# Patient Record
Sex: MALE | Race: WHITE | Employment: FULL TIME | URBAN - METROPOLITAN AREA
[De-identification: names, ages, dates, MRNs, and addresses within clinical notes are randomized per-mention and may not be internally consistent; named-entity substitution may affect disease eponyms.]

---

## 2018-07-02 ENCOUNTER — OFFICE VISIT (OUTPATIENT)
Dept: FAMILY MEDICINE CLINIC | Age: 23
End: 2018-07-02
Payer: COMMERCIAL

## 2018-07-02 ENCOUNTER — HOSPITAL ENCOUNTER (OUTPATIENT)
Age: 23
Discharge: HOME | End: 2018-07-04
Payer: COMMERCIAL

## 2018-07-02 VITALS
HEIGHT: 76 IN | HEART RATE: 96 BPM | WEIGHT: 229 LBS | SYSTOLIC BLOOD PRESSURE: 120 MMHG | OXYGEN SATURATION: 98 % | BODY MASS INDEX: 27.89 KG/M2 | RESPIRATION RATE: 18 BRPM | DIASTOLIC BLOOD PRESSURE: 88 MMHG | TEMPERATURE: 98.2 F

## 2018-07-02 DIAGNOSIS — R42 VERTIGO: ICD-10-CM

## 2018-07-02 DIAGNOSIS — R42 DIZZINESS: Primary | ICD-10-CM

## 2018-07-02 DIAGNOSIS — R42 DIZZINESS: ICD-10-CM

## 2018-07-02 LAB
ANION GAP SERPL CALCULATED.3IONS-SCNC: 12 MMOL/L (ref 7–16)
BASOPHILS ABSOLUTE: 0.03 E9/L (ref 0–0.2)
BASOPHILS RELATIVE PERCENT: 0.7 % (ref 0–2)
BUN BLDV-MCNC: 18 MG/DL (ref 6–20)
CALCIUM SERPL-MCNC: 9.6 MG/DL (ref 8.6–10.2)
CHLORIDE BLD-SCNC: 103 MMOL/L (ref 98–107)
CO2: 25 MMOL/L (ref 22–29)
CREAT SERPL-MCNC: 1.1 MG/DL (ref 0.7–1.2)
EOSINOPHILS ABSOLUTE: 0.15 E9/L (ref 0.05–0.5)
EOSINOPHILS RELATIVE PERCENT: 3.4 % (ref 0–6)
GFR AFRICAN AMERICAN: >60
GFR NON-AFRICAN AMERICAN: >60 ML/MIN/1.73
GLUCOSE BLD-MCNC: 91 MG/DL (ref 74–109)
HCT VFR BLD CALC: 45.8 % (ref 37–54)
HEMOGLOBIN: 15.6 G/DL (ref 12.5–16.5)
IMMATURE GRANULOCYTES #: 0.01 E9/L
IMMATURE GRANULOCYTES %: 0.2 % (ref 0–5)
LYMPHOCYTES ABSOLUTE: 1.48 E9/L (ref 1.5–4)
LYMPHOCYTES RELATIVE PERCENT: 33.1 % (ref 20–42)
MCH RBC QN AUTO: 29.4 PG (ref 26–35)
MCHC RBC AUTO-ENTMCNC: 34.1 % (ref 32–34.5)
MCV RBC AUTO: 86.4 FL (ref 80–99.9)
MONOCYTES ABSOLUTE: 0.28 E9/L (ref 0.1–0.95)
MONOCYTES RELATIVE PERCENT: 6.3 % (ref 2–12)
NEUTROPHILS ABSOLUTE: 2.52 E9/L (ref 1.8–7.3)
NEUTROPHILS RELATIVE PERCENT: 56.3 % (ref 43–80)
PDW BLD-RTO: 11.9 FL (ref 11.5–15)
PLATELET # BLD: 140 E9/L (ref 130–450)
PMV BLD AUTO: 12.1 FL (ref 7–12)
POTASSIUM SERPL-SCNC: 4.1 MMOL/L (ref 3.5–5)
RBC # BLD: 5.3 E12/L (ref 3.8–5.8)
SODIUM BLD-SCNC: 140 MMOL/L (ref 132–146)
WBC # BLD: 4.5 E9/L (ref 4.5–11.5)

## 2018-07-02 PROCEDURE — 1036F TOBACCO NON-USER: CPT | Performed by: FAMILY MEDICINE

## 2018-07-02 PROCEDURE — G8419 CALC BMI OUT NRM PARAM NOF/U: HCPCS | Performed by: FAMILY MEDICINE

## 2018-07-02 PROCEDURE — 99213 OFFICE O/P EST LOW 20 MIN: CPT | Performed by: FAMILY MEDICINE

## 2018-07-02 PROCEDURE — 80048 BASIC METABOLIC PNL TOTAL CA: CPT

## 2018-07-02 PROCEDURE — 85025 COMPLETE CBC W/AUTO DIFF WBC: CPT

## 2018-07-02 PROCEDURE — 36415 COLL VENOUS BLD VENIPUNCTURE: CPT | Performed by: FAMILY MEDICINE

## 2018-07-02 PROCEDURE — G8427 DOCREV CUR MEDS BY ELIG CLIN: HCPCS | Performed by: FAMILY MEDICINE

## 2018-07-02 RX ORDER — MECLIZINE HYDROCHLORIDE 25 MG/1
25 TABLET ORAL 3 TIMES DAILY PRN
COMMUNITY
End: 2018-09-10

## 2018-07-02 RX ORDER — METHYLPREDNISOLONE 4 MG/1
TABLET ORAL
Qty: 1 KIT | Refills: 0 | Status: SHIPPED | OUTPATIENT
Start: 2018-07-02 | End: 2018-09-10

## 2018-07-02 ASSESSMENT — ENCOUNTER SYMPTOMS
SORE THROAT: 0
WHEEZING: 0
COLOR CHANGE: 0
BACK PAIN: 0
DIARRHEA: 0
PHOTOPHOBIA: 0
SINUS PRESSURE: 0
VOMITING: 0
COUGH: 0
ABDOMINAL PAIN: 0
ALLERGIC/IMMUNOLOGIC NEGATIVE: 1
BLOOD IN STOOL: 0
APNEA: 0
NAUSEA: 0
FACIAL SWELLING: 0
SHORTNESS OF BREATH: 0
CHEST TIGHTNESS: 0

## 2018-07-02 ASSESSMENT — PATIENT HEALTH QUESTIONNAIRE - PHQ9
SUM OF ALL RESPONSES TO PHQ9 QUESTIONS 1 & 2: 0
2. FEELING DOWN, DEPRESSED OR HOPELESS: 0
SUM OF ALL RESPONSES TO PHQ QUESTIONS 1-9: 0
1. LITTLE INTEREST OR PLEASURE IN DOING THINGS: 0

## 2018-07-02 NOTE — PROGRESS NOTES
Chief Complaint:   Chief Complaint   Patient presents with    Dizziness     x2 weeks, last 3-4 days has been worse, went to ER and was told it was vertigo       Dizziness   This is a new problem. The current episode started in the past 7 days. The problem occurs intermittently. The problem has been gradually worsening. Pertinent negatives include no abdominal pain, arthralgias, chest pain, congestion, coughing, headaches, joint swelling, myalgias, nausea, rash, sore throat, vomiting or weakness. There is no problem list on file for this patient. No past medical history on file. No past surgical history on file. Current Outpatient Prescriptions   Medication Sig Dispense Refill    meclizine (ANTIVERT) 25 MG tablet Take 25 mg by mouth 3 times daily as needed      methylPREDNISolone (MEDROL, SHINE,) 4 MG tablet Take by mouth. 1 kit 0     No current facility-administered medications for this visit. Allergies   Allergen Reactions    Pcn [Penicillins] Rash       Social History     Social History    Marital status: Single     Spouse name: N/A    Number of children: N/A    Years of education: N/A     Social History Main Topics    Smoking status: Never Smoker    Smokeless tobacco: Never Used    Alcohol use No    Drug use: No    Sexual activity: Not Asked     Other Topics Concern    None     Social History Narrative    None       No family history on file. Review of Systems   Constitutional: Negative. HENT: Negative for congestion, facial swelling, hearing loss, nosebleeds, sinus pressure and sore throat. Eyes: Negative for photophobia and visual disturbance. Respiratory: Negative for apnea, cough, chest tightness, shortness of breath and wheezing. Cardiovascular: Negative for chest pain, palpitations and leg swelling. Gastrointestinal: Negative for abdominal pain, blood in stool, diarrhea, nausea and vomiting.    Genitourinary: Negative for difficulty urinating, frequency and urgency. Musculoskeletal: Negative for arthralgias, back pain, joint swelling and myalgias. Skin: Negative for color change and rash. Allergic/Immunologic: Negative. Neurological: Positive for dizziness. Negative for syncope, weakness, light-headedness and headaches. Hematological: Negative. Psychiatric/Behavioral: Negative for agitation, behavioral problems, confusion and self-injury. The patient is not nervous/anxious. All other systems reviewed and are negative. Physical Exam   Constitutional: He is oriented to person, place, and time. He appears well-developed and well-nourished. No distress. HENT:   Head: Normocephalic and atraumatic. Nose: Nose normal.   Mouth/Throat: Oropharynx is clear and moist.   Eyes: Conjunctivae and EOM are normal. Pupils are equal, round, and reactive to light. Neck: Normal range of motion. Neck supple. No JVD present. No thyromegaly present. Cardiovascular: Normal rate, regular rhythm and normal heart sounds. Exam reveals no gallop and no friction rub. No murmur heard. Pulmonary/Chest: Effort normal and breath sounds normal. No respiratory distress. He has no wheezes. Abdominal: Soft. Bowel sounds are normal. He exhibits no distension. There is no tenderness. There is no rebound and no guarding. Musculoskeletal: Normal range of motion. Lymphadenopathy:     He has no cervical adenopathy. Neurological: He is alert and oriented to person, place, and time. He has normal reflexes. No cranial nerve deficit. He exhibits normal muscle tone. Coordination normal.   Skin: Skin is warm and dry. No rash noted. No erythema. Psychiatric: He has a normal mood and affect. His behavior is normal. Judgment normal.   Nursing note and vitals reviewed. ASSESSMENT/PLAN:    Rafael Hernandez was seen today for dizziness.     Diagnoses and all orders for this visit:    Dizziness  -     CBC Auto Differential; Future  -     BASIC METABOLIC PANEL;

## 2018-09-10 ENCOUNTER — OFFICE VISIT (OUTPATIENT)
Dept: FAMILY MEDICINE CLINIC | Age: 23
End: 2018-09-10
Payer: COMMERCIAL

## 2018-09-10 VITALS
TEMPERATURE: 98.2 F | RESPIRATION RATE: 18 BRPM | DIASTOLIC BLOOD PRESSURE: 78 MMHG | OXYGEN SATURATION: 98 % | BODY MASS INDEX: 27.89 KG/M2 | HEIGHT: 76 IN | WEIGHT: 229 LBS | HEART RATE: 83 BPM | SYSTOLIC BLOOD PRESSURE: 110 MMHG

## 2018-09-10 DIAGNOSIS — R42 DIZZINESS: Primary | ICD-10-CM

## 2018-09-10 DIAGNOSIS — R42 VERTIGO: ICD-10-CM

## 2018-09-10 PROCEDURE — G8419 CALC BMI OUT NRM PARAM NOF/U: HCPCS | Performed by: FAMILY MEDICINE

## 2018-09-10 PROCEDURE — 1036F TOBACCO NON-USER: CPT | Performed by: FAMILY MEDICINE

## 2018-09-10 PROCEDURE — G8427 DOCREV CUR MEDS BY ELIG CLIN: HCPCS | Performed by: FAMILY MEDICINE

## 2018-09-10 PROCEDURE — 99214 OFFICE O/P EST MOD 30 MIN: CPT | Performed by: FAMILY MEDICINE

## 2018-09-10 ASSESSMENT — ENCOUNTER SYMPTOMS
DIARRHEA: 0
APNEA: 0
SINUS PRESSURE: 0
BLOOD IN STOOL: 0
ABDOMINAL PAIN: 0
BACK PAIN: 0
COLOR CHANGE: 0
VOMITING: 0
SORE THROAT: 0
NAUSEA: 0
FACIAL SWELLING: 0
ALLERGIC/IMMUNOLOGIC NEGATIVE: 1
SHORTNESS OF BREATH: 0
WHEEZING: 0
CHEST TIGHTNESS: 0
COUGH: 0
PHOTOPHOBIA: 0

## 2018-09-10 NOTE — PROGRESS NOTES
Chief Complaint:   Chief Complaint   Patient presents with    Dizziness     needs forms filled out for [employer] physical saying hes clear to work       Dizziness   This is a new problem. The current episode started more than 1 month ago. The problem occurs rarely. The problem has been resolved. Pertinent negatives include no abdominal pain, arthralgias, chest pain, congestion, coughing, headaches, joint swelling, myalgias, nausea, rash, sore throat, vomiting or weakness. dizziness resolved    There is no problem list on file for this patient. No past medical history on file. No past surgical history on file. No current outpatient prescriptions on file. No current facility-administered medications for this visit. Allergies   Allergen Reactions    Pcn [Penicillins] Rash       Social History     Social History    Marital status: Single     Spouse name: N/A    Number of children: N/A    Years of education: N/A     Social History Main Topics    Smoking status: Never Smoker    Smokeless tobacco: Never Used    Alcohol use No    Drug use: No    Sexual activity: Not Asked     Other Topics Concern    None     Social History Narrative    None       No family history on file. Review of Systems   Constitutional: Negative. HENT: Negative for congestion, facial swelling, hearing loss, nosebleeds, sinus pressure and sore throat. Eyes: Negative for photophobia and visual disturbance. Respiratory: Negative for apnea, cough, chest tightness, shortness of breath and wheezing. Cardiovascular: Negative for chest pain, palpitations and leg swelling. Gastrointestinal: Negative for abdominal pain, blood in stool, diarrhea, nausea and vomiting. Genitourinary: Negative for difficulty urinating, frequency and urgency. Musculoskeletal: Negative for arthralgias, back pain, joint swelling and myalgias. Skin: Negative for color change and rash. Allergic/Immunologic: Negative.

## 2019-01-22 RX ORDER — VALACYCLOVIR HYDROCHLORIDE 1 G/1
1000 TABLET, FILM COATED ORAL 2 TIMES DAILY
Qty: 20 TABLET | Refills: 1 | Status: SHIPPED | OUTPATIENT
Start: 2019-01-22 | End: 2019-03-15

## 2019-01-25 ENCOUNTER — HOSPITAL ENCOUNTER (OUTPATIENT)
Dept: AUDIOLOGY | Age: 24
Discharge: HOME | End: 2019-01-25
Payer: COMMERCIAL

## 2019-01-25 PROCEDURE — 92552 PURE TONE AUDIOMETRY AIR: CPT | Performed by: AUDIOLOGIST

## 2019-03-15 ENCOUNTER — HOSPITAL ENCOUNTER (OUTPATIENT)
Age: 24
Discharge: HOME | End: 2019-03-17
Payer: COMMERCIAL

## 2019-03-15 ENCOUNTER — OFFICE VISIT (OUTPATIENT)
Dept: FAMILY MEDICINE CLINIC | Age: 24
End: 2019-03-15
Payer: COMMERCIAL

## 2019-03-15 ENCOUNTER — HOSPITAL ENCOUNTER (OUTPATIENT)
Dept: GENERAL RADIOLOGY | Age: 24
Discharge: HOME | End: 2019-03-17
Payer: COMMERCIAL

## 2019-03-15 VITALS
HEART RATE: 75 BPM | WEIGHT: 240 LBS | SYSTOLIC BLOOD PRESSURE: 120 MMHG | BODY MASS INDEX: 29.22 KG/M2 | HEIGHT: 76 IN | DIASTOLIC BLOOD PRESSURE: 76 MMHG | RESPIRATION RATE: 18 BRPM | TEMPERATURE: 98.1 F | OXYGEN SATURATION: 97 %

## 2019-03-15 DIAGNOSIS — S13.9XXA NECK SPRAIN, INITIAL ENCOUNTER: ICD-10-CM

## 2019-03-15 DIAGNOSIS — S13.9XXA NECK SPRAIN, INITIAL ENCOUNTER: Primary | ICD-10-CM

## 2019-03-15 PROCEDURE — G8484 FLU IMMUNIZE NO ADMIN: HCPCS | Performed by: FAMILY MEDICINE

## 2019-03-15 PROCEDURE — 72050 X-RAY EXAM NECK SPINE 4/5VWS: CPT

## 2019-03-15 PROCEDURE — 1036F TOBACCO NON-USER: CPT | Performed by: FAMILY MEDICINE

## 2019-03-15 PROCEDURE — 99213 OFFICE O/P EST LOW 20 MIN: CPT | Performed by: FAMILY MEDICINE

## 2019-03-15 PROCEDURE — G8419 CALC BMI OUT NRM PARAM NOF/U: HCPCS | Performed by: FAMILY MEDICINE

## 2019-03-15 PROCEDURE — G8427 DOCREV CUR MEDS BY ELIG CLIN: HCPCS | Performed by: FAMILY MEDICINE

## 2019-03-15 RX ORDER — PREDNISONE 20 MG/1
20 TABLET ORAL 2 TIMES DAILY
Qty: 10 TABLET | Refills: 0 | Status: SHIPPED | OUTPATIENT
Start: 2019-03-15 | End: 2019-03-20

## 2019-03-15 RX ORDER — ORPHENADRINE CITRATE 100 MG/1
100 TABLET, EXTENDED RELEASE ORAL NIGHTLY PRN
Qty: 10 TABLET | Refills: 0 | Status: SHIPPED | OUTPATIENT
Start: 2019-03-15 | End: 2019-03-25

## 2019-03-15 ASSESSMENT — ENCOUNTER SYMPTOMS
SINUS PRESSURE: 0
SORE THROAT: 0
CHEST TIGHTNESS: 0
COLOR CHANGE: 0
COUGH: 0
FACIAL SWELLING: 0
WHEEZING: 0
ALLERGIC/IMMUNOLOGIC NEGATIVE: 1
APNEA: 0
SHORTNESS OF BREATH: 0
DIARRHEA: 0
VOMITING: 0
NAUSEA: 0
BACK PAIN: 0
ABDOMINAL PAIN: 0
PHOTOPHOBIA: 0
BLOOD IN STOOL: 0

## 2019-03-15 ASSESSMENT — PATIENT HEALTH QUESTIONNAIRE - PHQ9
1. LITTLE INTEREST OR PLEASURE IN DOING THINGS: 0
SUM OF ALL RESPONSES TO PHQ QUESTIONS 1-9: 0
2. FEELING DOWN, DEPRESSED OR HOPELESS: 0
SUM OF ALL RESPONSES TO PHQ QUESTIONS 1-9: 0
SUM OF ALL RESPONSES TO PHQ9 QUESTIONS 1 & 2: 0

## 2020-02-02 ENCOUNTER — HOSPITAL ENCOUNTER (EMERGENCY)
Age: 25
Discharge: HOME | End: 2020-02-02
Payer: COMMERCIAL

## 2020-02-02 VITALS
RESPIRATION RATE: 16 BRPM | OXYGEN SATURATION: 98 % | DIASTOLIC BLOOD PRESSURE: 80 MMHG | HEART RATE: 89 BPM | TEMPERATURE: 97.8 F | WEIGHT: 230 LBS | BODY MASS INDEX: 28.01 KG/M2 | SYSTOLIC BLOOD PRESSURE: 128 MMHG | HEIGHT: 76 IN

## 2020-02-02 LAB
INFLUENZA A BY PCR: NOT DETECTED
INFLUENZA B BY PCR: DETECTED

## 2020-02-02 PROCEDURE — 87502 INFLUENZA DNA AMP PROBE: CPT

## 2020-02-02 PROCEDURE — 99283 EMERGENCY DEPT VISIT LOW MDM: CPT

## 2020-02-02 RX ORDER — OSELTAMIVIR PHOSPHATE 75 MG/1
75 CAPSULE ORAL 2 TIMES DAILY
Qty: 10 CAPSULE | Refills: 0 | Status: SHIPPED | OUTPATIENT
Start: 2020-02-02 | End: 2020-02-07

## 2020-02-02 RX ORDER — IBUPROFEN 800 MG/1
800 TABLET ORAL EVERY 6 HOURS PRN
Qty: 21 TABLET | Refills: 0 | Status: SHIPPED | OUTPATIENT
Start: 2020-02-02 | End: 2020-09-18

## 2020-02-02 ASSESSMENT — PAIN DESCRIPTION - PAIN TYPE: TYPE: ACUTE PAIN

## 2020-02-02 ASSESSMENT — PAIN SCALES - GENERAL: PAINLEVEL_OUTOF10: 7

## 2020-02-02 ASSESSMENT — PAIN DESCRIPTION - PROGRESSION: CLINICAL_PROGRESSION: GRADUALLY WORSENING

## 2020-02-02 ASSESSMENT — PAIN DESCRIPTION - LOCATION: LOCATION: GENERALIZED

## 2020-02-02 ASSESSMENT — PAIN DESCRIPTION - FREQUENCY: FREQUENCY: CONTINUOUS

## 2020-02-02 ASSESSMENT — PAIN DESCRIPTION - ONSET: ONSET: ON-GOING

## 2020-02-02 ASSESSMENT — PAIN - FUNCTIONAL ASSESSMENT: PAIN_FUNCTIONAL_ASSESSMENT: PREVENTS OR INTERFERES SOME ACTIVE ACTIVITIES AND ADLS

## 2020-02-02 ASSESSMENT — PAIN DESCRIPTION - DESCRIPTORS: DESCRIPTORS: ACHING

## 2020-02-02 NOTE — ED PROVIDER NOTES
[facility]    HPI: Deedee Mendoza is a 22 y.o. male with a past medical history of  has no past medical history on file. presenting with complaints of a cough with nasal congestion. The patient states that these symptoms began gradually. The history is obtained from the patient. The patient states that he has had some subjective chills at home. Patient does complain of a mild cough associated with it that is nonproductive. Patient denies excessive fatigue or sleeping greater than 18 hours a day. Patient denies exposure to mononucleosis. The patient denies any abdominal pain, left upper quadrant fullness, or early satiety. The patient also denies difficulty breathing, hemoptysis, neck pain/stiffness, or blurry vision. Sx have persisted and are mildly worse which is what prompted the visit today. unkown exposure to sick contacts, ending a fever, body aches, chills, nasal congestion with cough which he states began 2 days ago. Earlier fever earlier today was at 101.2. He did take Tylenol. He is afebrile upon arrival.        ROS:   Pertinent positives and negatives are stated within HPI, all other systems reviewed and are negative.      --------------------------------------------- PAST HISTORY ---------------------------------------------  Past Medical History:  has no past medical history on file. Past Surgical History:  has no past surgical history on file. Social History:  reports that he has never smoked. He has never used smokeless tobacco. He reports that he does not drink alcohol or use drugs. Family History: family history is not on file. The patients home medications have been reviewed. Allergies: Pcn [penicillins]    ------------------------- NURSING NOTES AND VITALS REVIEWED ---------------------------   The nursing notes within the ED encounter and vital signs as below have been reviewed by myself.   /80   Pulse 89   Temp 97.8 °F (36.6 °C) (Oral)   Resp 16   Ht 6' 4\" (1.93 m)   Wt 230 lb (104.3 kg)   SpO2 98%   BMI 28.00 kg/m²   Oxygen Saturation Interpretation: Normal    The patients available past medical records and past encounters were reviewed. Physical exam:  Constitutional: Vital signs are reviewed the patient is comfortable. The patient is alert and oriented and conversant. Head: The head is atraumatic and normocephalic. Eyes: No discharge is present from the eyes. The sclera are normal.  ENT: The oropharynx demonstrates a small amount of erythema bilaterally. There is no tonsillar enlargement nor is there any exudate present. No uvular deviation or edema. No tonsillary asymmetry.  Floor of the mouth soft, no trismus, handling secretions. TMs bilaterally demonstrate no evidence of infection. Neck: Normal range of motion is achieved in the neck. There is no JVD present. No meningeal signs are present   Anterior cervical adenopathy is nromal.  Respiratory/chest: The chest is nontender. Breath sounds are normal. There is no respiratory distress.   Cardiovascular: Heart shows a regular rate and rhythm without murmurs clicks or gallops. Abdominal exam: The abdomen is non tender without evidence of peritoneal signs.  Specific attention to the left upper quadrant with palpation of the spleen demonstrates no organomegaly or tenderness  Skin: warm and dry, without rash  Neurologic: GCS 15   Psych: Normal Affect  -------------------------------------------------- RESULTS -------------------------------------------------    LABS:  Results for orders placed or performed during the hospital encounter of 02/02/20   Rapid influenza A/B antigens   Result Value Ref Range    Influenza A by PCR Not Detected Not Detected    Influenza B by PCR DETECTED (A) Not Detected       RADIOLOGY:  Interpreted by Radiologist.  No orders to display         ------------------------------ ED COURSE/MEDICAL DECISION MAKING----------------------  Medications - No data to

## 2020-09-17 ENCOUNTER — NURSE TRIAGE (OUTPATIENT)
Dept: OTHER | Facility: CLINIC | Age: 25
End: 2020-09-17

## 2020-09-17 ENCOUNTER — TELEPHONE (OUTPATIENT)
Dept: PRIMARY CARE CLINIC | Age: 25
End: 2020-09-17

## 2020-09-17 ENCOUNTER — HOSPITAL ENCOUNTER (EMERGENCY)
Age: 25
Discharge: HOME | End: 2020-09-17
Payer: COMMERCIAL

## 2020-09-17 VITALS
HEART RATE: 91 BPM | DIASTOLIC BLOOD PRESSURE: 78 MMHG | WEIGHT: 240 LBS | RESPIRATION RATE: 16 BRPM | SYSTOLIC BLOOD PRESSURE: 128 MMHG | OXYGEN SATURATION: 98 % | TEMPERATURE: 98.7 F | BODY MASS INDEX: 29.21 KG/M2

## 2020-09-17 PROCEDURE — 99283 EMERGENCY DEPT VISIT LOW MDM: CPT

## 2020-09-17 PROCEDURE — 6360000002 HC RX W HCPCS: Performed by: PHYSICIAN ASSISTANT

## 2020-09-17 PROCEDURE — 99282 EMERGENCY DEPT VISIT SF MDM: CPT

## 2020-09-17 PROCEDURE — 96372 THER/PROPH/DIAG INJ SC/IM: CPT

## 2020-09-17 RX ORDER — DEXAMETHASONE SODIUM PHOSPHATE 10 MG/ML
10 INJECTION, SOLUTION INTRAMUSCULAR; INTRAVENOUS ONCE
Status: COMPLETED | OUTPATIENT
Start: 2020-09-17 | End: 2020-09-17

## 2020-09-17 RX ORDER — PREDNISONE 20 MG/1
TABLET ORAL
Qty: 18 TABLET | Refills: 0 | Status: SHIPPED | OUTPATIENT
Start: 2020-09-17 | End: 2020-09-27

## 2020-09-17 RX ADMIN — DEXAMETHASONE SODIUM PHOSPHATE 10 MG: 10 INJECTION, SOLUTION INTRAMUSCULAR; INTRAVENOUS at 20:24

## 2020-09-17 ASSESSMENT — PAIN SCALES - GENERAL: PAINLEVEL_OUTOF10: 7

## 2020-09-17 ASSESSMENT — PAIN DESCRIPTION - DESCRIPTORS: DESCRIPTORS: ITCHING

## 2020-09-17 NOTE — TELEPHONE ENCOUNTER
Reason for Disposition   Patient wants to be seen    Answer Assessment - Initial Assessment Questions  1. APPEARANCE of RASH: \"Describe the rash. \"       Red welts   2. LOCATION: \"Where is the rash located? \"       Both forearms up arms face  3. SIZE: \"How large is the rash? \"       Random small spots some are welts   4. ONSET: \"When did the rash begin? \"       Tuesday night   5. ITCHING: \"Does the rash itch? \" If so, ask: \"How bad is it? \"    - MILD - doesn't interfere with normal activities    - MODERATE-SEVERE: interferes with work, school, sleep, or other activities       Yes moderate  6. PREGNANCY: \"Is there any chance you are pregnant? \" \"When was your last menstrual period? \"      n/a    Protocols used: POISON IVY - [place] - SUMAC-ADULT-[place]    Pt will be seen tomorrow morning   Caller provided care advice and instructed to call back with worsening symptoms. Please do not respond to the triage nurse through this encounter. Any subsequent communication should be directly with the patient.

## 2020-09-18 ENCOUNTER — OFFICE VISIT (OUTPATIENT)
Dept: PRIMARY CARE CLINIC | Age: 25
End: 2020-09-18
Payer: COMMERCIAL

## 2020-09-18 VITALS
DIASTOLIC BLOOD PRESSURE: 74 MMHG | RESPIRATION RATE: 16 BRPM | OXYGEN SATURATION: 98 % | HEIGHT: 76 IN | SYSTOLIC BLOOD PRESSURE: 128 MMHG | HEART RATE: 104 BPM | BODY MASS INDEX: 30.2 KG/M2 | WEIGHT: 248 LBS | TEMPERATURE: 97.8 F

## 2020-09-18 PROCEDURE — G8427 DOCREV CUR MEDS BY ELIG CLIN: HCPCS | Performed by: FAMILY MEDICINE

## 2020-09-18 PROCEDURE — 99213 OFFICE O/P EST LOW 20 MIN: CPT | Performed by: FAMILY MEDICINE

## 2020-09-18 PROCEDURE — 1036F TOBACCO NON-USER: CPT | Performed by: FAMILY MEDICINE

## 2020-09-18 PROCEDURE — G8419 CALC BMI OUT NRM PARAM NOF/U: HCPCS | Performed by: FAMILY MEDICINE

## 2020-09-18 ASSESSMENT — PATIENT HEALTH QUESTIONNAIRE - PHQ9
1. LITTLE INTEREST OR PLEASURE IN DOING THINGS: 0
SUM OF ALL RESPONSES TO PHQ9 QUESTIONS 1 & 2: 0
SUM OF ALL RESPONSES TO PHQ QUESTIONS 1-9: 0
2. FEELING DOWN, DEPRESSED OR HOPELESS: 0
SUM OF ALL RESPONSES TO PHQ QUESTIONS 1-9: 0

## 2020-09-18 ASSESSMENT — ENCOUNTER SYMPTOMS
APNEA: 0
VOMITING: 0
RHINORRHEA: 0
COLOR CHANGE: 0
SORE THROAT: 0
ABDOMINAL PAIN: 0
DIARRHEA: 0
BACK PAIN: 0
CONSTIPATION: 0
BLOOD IN STOOL: 0
NAUSEA: 0
NAIL CHANGES: 0
EYE REDNESS: 0
EYE PAIN: 0
COUGH: 0
EYE ITCHING: 0
WHEEZING: 0
CHEST TIGHTNESS: 0
SHORTNESS OF BREATH: 0
SINUS PRESSURE: 0

## 2020-09-18 NOTE — ED PROVIDER NOTES
[facility]       Department of Emergency Medicine   ED  Provider Note  Admit Date/RoomTime: 9/17/2020  7:46 PM  ED Room: 13/13  Chief Complaint   Poison Ivy (Pt states rash started yesterday after cutting wood)    History of Present Illness   Source of history provided by:  patient. History/Exam Limitations: none. Сергей Cash is a 26 y.o. old male with has a past medical history of: No past medical history on file. presents to the emergency department by private vehicle, for complaint of persistent pruritic rash which started yesterday of his upper arms and face. Patient states the day before he was cutting wood and might of been exposed to some type of poison ivy. Patient states he has tried calamine lotion and other topical poison ivy remedies with no improvement. Patient denies anything making it worse. Patient denies any other symptoms or complaints at this time. ROS    Pertinent positives and negatives are stated within HPI, all other systems reviewed and are negative. No past surgical history on file. Social History:  reports that he has never smoked. He has never used smokeless tobacco. He reports that he does not drink alcohol or use drugs. Family History: family history is not on file. Allergies: Pcn [penicillins]    Physical Exam           ED Triage Vitals   BP Temp Temp Source Pulse Resp SpO2 Height Weight   09/17/20 1953 09/17/20 1953 09/17/20 1953 09/17/20 1953 09/17/20 1953 09/17/20 1953 -- 09/17/20 2002   128/78 98.7 °F (37.1 °C) Infrared 91 16 98 %  240 lb (108.9 kg)     Oxygen Saturation Interpretation: Normal.    Constitutional:  Alert, development consistent with age. HEENT:  NC/NT. Airway patent. Eyes:  PERRL, EOMI, no discharge. Ears:  TMs without perforation, injection, or bulging. External canals clear without exudate.   Mouth:  Mucous membranes moist without lesions, tongue and gums normal.  Throat:  Pharynx without injection, exudate, or tonsillar hypertrophy. Airway patient. Neck:  Supple. No lymphadenopathy. Respiratory:  Clear to auscultation and breath sounds equal.  CV:  Regular rate and rhythm. GI:  Abdomen Soft, nontender, +BS. Integument:  Skin turgor: Normal.              Linear papular rash of the upper arms and face c/w plant dermatitis. Neurological:  Orientation age-appropriate unless noted elseware. Motor functions intact. Lab / Imaging Results   (All laboratory and radiology results have been personally reviewed by myself)  Labs:  No results found for this visit on 09/17/20. Imaging: All Radiology results interpreted by Radiologist unless otherwise noted. No orders to display       ED Course / Medical Decision Making     Medications   dexamethasone (PF) (DECADRON) injection 10 mg (has no administration in time range)        Consults:   None    Procedures:   none    MDM:   Patient presenting with poison ivy. Patient is in no acute distress, afebrile, nontoxic in appearance. Patient will be given a dose of Decadron here. Patient also be given a prednisone taper. Discussed supportive care with patient. Recommended patient follow-up with PCP. Recommended patient return to the ED with new or worsening of symptoms. Counseling: The emergency provider has spoken with the patient and discussed todays results, in addition to providing specific details for the plan of care and counseling regarding the diagnosis and prognosis. Questions are answered at this time and they are agreeable with the plan. Assessment      1. Plant dermatitis      Plan   Discharge to home  Patient condition is stable    New Medications     New Prescriptions    PREDNISONE (DELTASONE) 20 MG TABLET    Sig.: Take 60mg  Po qd x 3 days, then 40mg po qd x3 days, then 20mg po qd x 3 days. QS x 9 days     Electronically signed by Forest Bee PA-C   DD: 9/17/20  **This report was transcribed using voice recognition software.  Every effort was made to

## 2020-09-18 NOTE — PROGRESS NOTES
Chief Complaint:     Chief Complaint   Patient presents with    Rash     went to ER lst night becasue it started going onto his face and by his eye, was told it was poison ivy         Rash   This is a new problem. The current episode started yesterday. The problem is unchanged. The affected locations include the right elbow, right arm, left arm and left elbow. The rash is characterized by redness, pain and itchiness. Pertinent negatives include no congestion, cough, diarrhea, eye pain, fatigue, fever, joint pain, nail changes, rhinorrhea, shortness of breath, sore throat or vomiting. Past treatments include nothing. The treatment provided no relief. There is no problem list on file for this patient. History reviewed. No pertinent past medical history. History reviewed. No pertinent surgical history. Current Outpatient Medications   Medication Sig Dispense Refill    predniSONE (DELTASONE) 20 MG tablet Sig.: Take 60mg  Po qd x 3 days, then 40mg po qd x3 days, then 20mg po qd x 3 days. QS x 9 days (Patient not taking: Reported on 9/18/2020) 18 tablet 0     No current facility-administered medications for this visit.         Allergies   Allergen Reactions    Pcn [Penicillins] Rash       Social History     Socioeconomic History    Marital status: Single     Spouse name: None    Number of children: None    Years of education: None    Highest education level: None   Occupational History    None   Social Needs    Financial resource strain: None    Food insecurity     Worry: None     Inability: None    Transportation needs     Medical: None     Non-medical: None   Tobacco Use    Smoking status: Never Smoker    Smokeless tobacco: Never Used   Substance and Sexual Activity    Alcohol use: No    Drug use: No    Sexual activity: None   Lifestyle    Physical activity     Days per week: None     Minutes per session: None    Stress: None   Relationships    Social connections     Talks on phone: 30.19 kg/m²     Physical Exam  Vitals signs and nursing note reviewed. Constitutional:       General: He is not in acute distress. Appearance: Normal appearance. He is well-developed. HENT:      Head: Normocephalic and atraumatic. Right Ear: Hearing, tympanic membrane and external ear normal. No tenderness. No middle ear effusion. Left Ear: Hearing, tympanic membrane and external ear normal. No tenderness. No middle ear effusion. Nose: Nose normal. No congestion or rhinorrhea. Right Turbinates: Not enlarged. Left Turbinates: Not enlarged. Mouth/Throat:      Mouth: Mucous membranes are moist.      Tongue: No lesions. Pharynx: Oropharynx is clear. No oropharyngeal exudate or posterior oropharyngeal erythema. Eyes:      General: No scleral icterus. Conjunctiva/sclera: Conjunctivae normal.      Pupils: Pupils are equal, round, and reactive to light. Neck:      Musculoskeletal: Normal range of motion and neck supple. No neck rigidity or muscular tenderness. Thyroid: No thyromegaly. Cardiovascular:      Rate and Rhythm: Normal rate and regular rhythm. Heart sounds: Normal heart sounds. No murmur. Pulmonary:      Effort: Pulmonary effort is normal. No respiratory distress. Breath sounds: Normal breath sounds. No wheezing or rales. Abdominal:      General: Bowel sounds are normal. There is no distension. Palpations: Abdomen is soft. Tenderness: There is no abdominal tenderness. Musculoskeletal: Normal range of motion. General: No tenderness. Lymphadenopathy:      Cervical: No cervical adenopathy. Skin:     General: Skin is warm and dry. Findings: No erythema or rash. Neurological:      General: No focal deficit present. Mental Status: He is alert and oriented to person, place, and time. Cranial Nerves: No cranial nerve deficit. Deep Tendon Reflexes: Reflexes are normal and symmetric.  Reflexes normal. Psychiatric:         Mood and Affect: Mood normal.                                 ASSESSMENT/PLAN:    There is no problem list on file for this patient. Pastora Jara was seen today for rash. Diagnoses and all orders for this visit:    Allergic dermatitis due to poison ivy      Received IM steroid in ED  Has Rx for oral prednisone    Return if symptoms worsen or fail to improve. I spent 15 minutes with this patient. I spent greater than 50% of the time counseling this patient.         Marion Wyatt DO  9/18/2020  8:23 AM

## 2020-09-25 ENCOUNTER — TELEPHONE (OUTPATIENT)
Dept: PRIMARY CARE CLINIC | Age: 25
End: 2020-09-25

## 2020-09-25 RX ORDER — PREDNISONE 20 MG/1
TABLET ORAL
Qty: 11 TABLET | Refills: 0 | Status: SHIPPED
Start: 2020-09-25 | End: 2021-06-23

## 2020-09-25 NOTE — TELEPHONE ENCOUNTER
Patient calling he has on pill of prednisone left for tomorrow but still developing new areas of poison ivy wanting to know your recommendations.   Mary Holland

## 2020-10-12 RX ORDER — VALACYCLOVIR HYDROCHLORIDE 1 G/1
1000 TABLET, FILM COATED ORAL 2 TIMES DAILY
Qty: 20 TABLET | Refills: 1 | Status: SHIPPED
Start: 2020-10-12 | End: 2021-06-23

## 2021-01-14 ENCOUNTER — TELEPHONE (OUTPATIENT)
Dept: PRIMARY CARE CLINIC | Age: 26
End: 2021-01-14

## 2021-01-14 DIAGNOSIS — R05.9 COUGH: Primary | ICD-10-CM

## 2021-01-14 RX ORDER — BENZONATATE 100 MG/1
100 CAPSULE ORAL 3 TIMES DAILY PRN
Qty: 30 CAPSULE | Refills: 0 | Status: SHIPPED | OUTPATIENT
Start: 2021-01-14 | End: 2021-01-21

## 2021-01-14 RX ORDER — AZITHROMYCIN 250 MG/1
TABLET, FILM COATED ORAL
Qty: 6 TABLET | Refills: 0 | Status: SHIPPED
Start: 2021-01-14 | End: 2021-06-23

## 2021-01-14 NOTE — TELEPHONE ENCOUNTER
Pt calling said he tested positive for covid this morning. Was not given anything wants to know if there is anything that you could call in for him. symptoms are, fever, body aches, headache, head congestion & cough.

## 2021-06-23 ENCOUNTER — APPOINTMENT (OUTPATIENT)
Dept: CT IMAGING | Age: 26
End: 2021-06-23
Payer: COMMERCIAL

## 2021-06-23 ENCOUNTER — HOSPITAL ENCOUNTER (EMERGENCY)
Age: 26
Discharge: HOME | End: 2021-06-24
Attending: EMERGENCY MEDICINE
Payer: COMMERCIAL

## 2021-06-23 DIAGNOSIS — H81.10 BENIGN PAROXYSMAL POSITIONAL VERTIGO, UNSPECIFIED LATERALITY: Primary | ICD-10-CM

## 2021-06-23 DIAGNOSIS — E86.0 DEHYDRATION: ICD-10-CM

## 2021-06-23 LAB — SARS-COV-2, NAAT: NOT DETECTED

## 2021-06-23 PROCEDURE — 72125 CT NECK SPINE W/O DYE: CPT

## 2021-06-23 PROCEDURE — 96375 TX/PRO/DX INJ NEW DRUG ADDON: CPT

## 2021-06-23 PROCEDURE — 2580000003 HC RX 258: Performed by: EMERGENCY MEDICINE

## 2021-06-23 PROCEDURE — 70450 CT HEAD/BRAIN W/O DYE: CPT

## 2021-06-23 PROCEDURE — 99283 EMERGENCY DEPT VISIT LOW MDM: CPT

## 2021-06-23 PROCEDURE — 87635 SARS-COV-2 COVID-19 AMP PRB: CPT

## 2021-06-23 PROCEDURE — 96361 HYDRATE IV INFUSION ADD-ON: CPT

## 2021-06-23 PROCEDURE — 6360000002 HC RX W HCPCS: Performed by: EMERGENCY MEDICINE

## 2021-06-23 PROCEDURE — 96374 THER/PROPH/DIAG INJ IV PUSH: CPT

## 2021-06-23 RX ORDER — DIPHENHYDRAMINE HYDROCHLORIDE 50 MG/ML
50 INJECTION INTRAMUSCULAR; INTRAVENOUS ONCE
Status: COMPLETED | OUTPATIENT
Start: 2021-06-23 | End: 2021-06-23

## 2021-06-23 RX ORDER — 0.9 % SODIUM CHLORIDE 0.9 %
1000 INTRAVENOUS SOLUTION INTRAVENOUS ONCE
Status: COMPLETED | OUTPATIENT
Start: 2021-06-23 | End: 2021-06-24

## 2021-06-23 RX ORDER — LORAZEPAM 2 MG/ML
1 INJECTION INTRAMUSCULAR ONCE
Status: COMPLETED | OUTPATIENT
Start: 2021-06-23 | End: 2021-06-23

## 2021-06-23 RX ORDER — METOCLOPRAMIDE HYDROCHLORIDE 5 MG/ML
10 INJECTION INTRAMUSCULAR; INTRAVENOUS ONCE
Status: COMPLETED | OUTPATIENT
Start: 2021-06-23 | End: 2021-06-23

## 2021-06-23 RX ADMIN — DIPHENHYDRAMINE HYDROCHLORIDE 50 MG: 50 INJECTION INTRAMUSCULAR; INTRAVENOUS at 22:31

## 2021-06-23 RX ADMIN — SODIUM CHLORIDE 1000 ML: 9 INJECTION, SOLUTION INTRAVENOUS at 23:04

## 2021-06-23 RX ADMIN — METOCLOPRAMIDE HYDROCHLORIDE 10 MG: 5 INJECTION INTRAMUSCULAR; INTRAVENOUS at 22:31

## 2021-06-23 RX ADMIN — LORAZEPAM 1 MG: 2 INJECTION INTRAMUSCULAR; INTRAVENOUS at 22:31

## 2021-06-23 RX ADMIN — SODIUM CHLORIDE 1000 ML: 9 INJECTION, SOLUTION INTRAVENOUS at 22:31

## 2021-06-23 ASSESSMENT — PAIN DESCRIPTION - PAIN TYPE: TYPE: ACUTE PAIN

## 2021-06-23 ASSESSMENT — PAIN DESCRIPTION - LOCATION: LOCATION: NECK

## 2021-06-23 ASSESSMENT — PAIN SCALES - GENERAL: PAINLEVEL_OUTOF10: 6

## 2021-06-23 ASSESSMENT — PAIN DESCRIPTION - DESCRIPTORS: DESCRIPTORS: CONSTANT;NAGGING

## 2021-06-24 ENCOUNTER — TELEPHONE (OUTPATIENT)
Dept: PRIMARY CARE CLINIC | Age: 26
End: 2021-06-24

## 2021-06-24 VITALS
DIASTOLIC BLOOD PRESSURE: 74 MMHG | HEIGHT: 76 IN | RESPIRATION RATE: 18 BRPM | TEMPERATURE: 97 F | WEIGHT: 230 LBS | OXYGEN SATURATION: 97 % | HEART RATE: 81 BPM | BODY MASS INDEX: 28.01 KG/M2 | SYSTOLIC BLOOD PRESSURE: 118 MMHG

## 2021-06-24 RX ORDER — CYCLOBENZAPRINE HCL 10 MG
10 TABLET ORAL 3 TIMES DAILY PRN
Qty: 20 TABLET | Refills: 0 | Status: SHIPPED | OUTPATIENT
Start: 2021-06-24 | End: 2021-07-04

## 2021-06-24 RX ORDER — BUTALBITAL, ACETAMINOPHEN AND CAFFEINE 300; 40; 50 MG/1; MG/1; MG/1
1 CAPSULE ORAL EVERY 4 HOURS PRN
Qty: 30 CAPSULE | Refills: 0 | Status: SHIPPED | OUTPATIENT
Start: 2021-06-24 | End: 2021-07-15

## 2021-06-24 RX ORDER — LORATADINE AND PSEUDOEPHEDRINE SULFATE 5; 120 MG/1; MG/1
1 TABLET, EXTENDED RELEASE ORAL 2 TIMES DAILY
Qty: 20 TABLET | Refills: 0 | Status: SHIPPED | OUTPATIENT
Start: 2021-06-24 | End: 2021-07-12

## 2021-06-24 RX ORDER — MECLIZINE HYDROCHLORIDE 25 MG/1
25 TABLET ORAL 3 TIMES DAILY PRN
Qty: 20 TABLET | Refills: 1 | Status: SHIPPED | OUTPATIENT
Start: 2021-06-24 | End: 2021-07-15

## 2021-06-24 NOTE — ED PROVIDER NOTES
HPI:  6/24/21, Time: 12:43 AM EDT        Sesar Lawler is a 23 y.o. male presenting to the ED for intermittent neck discomfort x1 week post 2 weeks of fatigue, intermittent dizziness and also nausea and vomiting intermittently. The complaint has been persistent, moderate in severity, and worsened by nothing. Patient has not had any reported fevers/chills, no cough no colored sputum production, no hemoptysis, no change in bowel habit patterns except as stated above. Patient is not had any diarrhea, no reported hematemesis no melena no hematochezia reported. Patient denies any history of any known exposures. Patient rates his pain at 6/10 severity. No relieving factors are reported. Patient denies any focal extremity weakness, no slurred speech or difficulty speaking and no vision loss associated. Review of Systems:   A complete review of systems was performed and pertinent positives and negatives are stated within HPI, all other systems reviewed and are negative.    --------------------------------------------- PAST HISTORY ---------------------------------------------  Past Medical History:  has no past medical history on file. Past Surgical History:  has no past surgical history on file. Social History:  reports that he has never smoked. He has never used smokeless tobacco. He reports that he does not drink alcohol and does not use drugs. Family History: family history is not on file. The patients home medications have been reviewed.     Allergies: Pcn [penicillins]    -------------------------------------------------- RESULTS -------------------------------------------------  All laboratory and radiology results have been personally reviewed by myself   LABS:  Results for orders placed or performed during the hospital encounter of 06/23/21   COVID-19, Rapid    Specimen: Nasopharyngeal Swab   Result Value Ref Range    SARS-CoV-2, NAAT Not Detected Not Detected       RADIOLOGY:  Interpreted by Radiologist.  [address]   Final Result   No acute fracture or subluxation in the cervical spine. Small broad-based posterior disc osteophyte complex at C5-C6. No definite central canal stenosis or neural foraminal narrowing at any level. An air bubble in the soft tissues of the left upper hemithorax anteriorly,   which may be in a venous structure and related to recent vascular access   attempt. CT HEAD WO CONTRAST   Final Result   No acute intracranial abnormality.           ------------------------- NURSING NOTES AND VITALS REVIEWED ---------------------------    The nursing notes within the ED encounter and vital signs as below have been reviewed. /74   Pulse 81   Temp 97 °F (36.1 °C) (Infrared)   Resp 18   Ht 6' 4\" (1.93 m)   Wt 230 lb (104.3 kg)   SpO2 97%   BMI 28.00 kg/m² . Oxygen Saturation Interpretation: Normal    ---------------------------------------------------PHYSICAL EXAM--------------------------------------    Constitutional/General: Alert and oriented x3, well appearing, non toxic in moderate distress on the  Head: Normocephalic and atraumatic  Eyes: PERRL, EOMI  Mouth: Oropharynx clear, handling secretions, no trismus  Neck: Supple, full ROM,   Pulmonary: Lungs clear to auscultation bilaterally, no wheezes, rales, or rhonchi. Not in respiratory distress  Cardiovascular:  Regular rate and rhythm, no murmurs, gallops, or rubs. 2+ distal pulses  Abdomen: Soft, non tender, non distended, get a megaly no mass no guarding or rigidity normal bowel sounds  Extremities: Moves all extremities x 4.  Warm and well perfused  Skin: warm and dry without rash  Neurologic: GCS 15, cranial nerves II through XII intact no focal deficits no meningeal signs but  Psych: Normal Affect    ------------------------------ ED COURSE/MEDICAL DECISION MAKING----------------------  Medications   0.9 % sodium chloride bolus (0 mLs Intravenous Stopped 6/24/21 0101) diphenhydrAMINE (BENADRYL) injection 50 mg (50 mg Intravenous Given 6/23/21 2231)   metoclopramide (REGLAN) injection 10 mg (10 mg Intravenous Given 6/23/21 2231)   LORazepam (ATIVAN) injection 1 mg (1 mg Intravenous Given 6/23/21 2231)   0.9 % sodium chloride bolus (0 mLs Intravenous Stopped 6/24/21 0101)       ED COURSE:     Medical Decision Making:   Patient's headache is felt to be likely musculoskeletal /muscular tension related. SAH is not felt to be likely as patient does not report this as the worst headache of his life, nor was there a sudden thunderclap onset of headache. .  He likely has intermittent cervical strain and his fiancée did report he complained of \"popping\" sensation in his neck at times with certain movement. Patient is not had any radicular symptoms to cyst to suggest a cervical radiculopathy and his CT cervical study did not show any evidence of significant disc herniation. 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 Patient symptoms were abated at the time of discharge he is given home-going prescription for Antivert as needed he is referred to his PCP for outpatient follow-up. Counseling: The emergency provider has spoken with the patient and discussed todays results, in addition to providing specific details for the plan of care and counseling regarding the diagnosis and prognosis. Questions are answered at this time and they are agreeable with the plan.    --------------------------------- IMPRESSION AND DISPOSITION ---------------------------------    IMPRESSION  1. Benign paroxysmal positional vertigo, unspecified laterality    2. Dehydration        DISPOSITION  Disposition: Discharge to home  Patient condition is stable      NOTE: This report was transcribed using voice recognition software.  Every effort was made to ensure accuracy; however, inadvertent computerized transcription errors may be present        Stacy Chan MD  06/24/21 0284

## 2021-06-24 NOTE — TELEPHONE ENCOUNTER
Patients mom called stating that the patient is still dizzy and lightheaded with neck pain. She stated that they really didn't do much for hi at ER last evening. She wanted to have patient added on to schedule tomorrow to see PCP. No appointments available. Patient is scheduled to see him on Tuesday at this time. Please contact patient if he is able to be added to his schedule.  Thank you

## 2021-06-25 ENCOUNTER — OFFICE VISIT (OUTPATIENT)
Dept: PRIMARY CARE CLINIC | Age: 26
End: 2021-06-25
Payer: COMMERCIAL

## 2021-06-25 VITALS
WEIGHT: 230 LBS | RESPIRATION RATE: 16 BRPM | SYSTOLIC BLOOD PRESSURE: 118 MMHG | OXYGEN SATURATION: 98 % | HEIGHT: 76 IN | DIASTOLIC BLOOD PRESSURE: 78 MMHG | BODY MASS INDEX: 28.01 KG/M2 | HEART RATE: 78 BPM

## 2021-06-25 DIAGNOSIS — R42 DIZZINESS: ICD-10-CM

## 2021-06-25 DIAGNOSIS — M50.30 DDD (DEGENERATIVE DISC DISEASE), CERVICAL: Primary | ICD-10-CM

## 2021-06-25 LAB
ALBUMIN SERPL-MCNC: 4.5 G/DL (ref 3.5–5.2)
ALP BLD-CCNC: 75 U/L (ref 40–129)
ALT SERPL-CCNC: 26 U/L (ref 0–40)
ANION GAP SERPL CALCULATED.3IONS-SCNC: 11 MMOL/L (ref 7–16)
AST SERPL-CCNC: 21 U/L (ref 0–39)
BASOPHILS ABSOLUTE: 0.03 E9/L (ref 0–0.2)
BASOPHILS RELATIVE PERCENT: 0.7 % (ref 0–2)
BILIRUB SERPL-MCNC: 0.5 MG/DL (ref 0–1.2)
BUN BLDV-MCNC: 18 MG/DL (ref 6–20)
CALCIUM SERPL-MCNC: 9.2 MG/DL (ref 8.6–10.2)
CHLORIDE BLD-SCNC: 101 MMOL/L (ref 98–107)
CO2: 27 MMOL/L (ref 22–29)
CREAT SERPL-MCNC: 1.1 MG/DL (ref 0.7–1.2)
EOSINOPHILS ABSOLUTE: 0.08 E9/L (ref 0.05–0.5)
EOSINOPHILS RELATIVE PERCENT: 1.8 % (ref 0–6)
GFR AFRICAN AMERICAN: >60
GFR NON-AFRICAN AMERICAN: >60 ML/MIN/1.73
GLUCOSE BLD-MCNC: 71 MG/DL (ref 74–99)
HCT VFR BLD CALC: 42.8 % (ref 37–54)
HEMOGLOBIN: 14.5 G/DL (ref 12.5–16.5)
IMMATURE GRANULOCYTES #: 0.01 E9/L
IMMATURE GRANULOCYTES %: 0.2 % (ref 0–5)
LYMPHOCYTES ABSOLUTE: 1.35 E9/L (ref 1.5–4)
LYMPHOCYTES RELATIVE PERCENT: 30.1 % (ref 20–42)
MCH RBC QN AUTO: 30 PG (ref 26–35)
MCHC RBC AUTO-ENTMCNC: 33.9 % (ref 32–34.5)
MCV RBC AUTO: 88.4 FL (ref 80–99.9)
MONOCYTES ABSOLUTE: 0.36 E9/L (ref 0.1–0.95)
MONOCYTES RELATIVE PERCENT: 8 % (ref 2–12)
NEUTROPHILS ABSOLUTE: 2.65 E9/L (ref 1.8–7.3)
NEUTROPHILS RELATIVE PERCENT: 59.2 % (ref 43–80)
PDW BLD-RTO: 12.2 FL (ref 11.5–15)
PLATELET # BLD: 142 E9/L (ref 130–450)
PMV BLD AUTO: 11.2 FL (ref 7–12)
POTASSIUM SERPL-SCNC: 4 MMOL/L (ref 3.5–5)
RBC # BLD: 4.84 E12/L (ref 3.8–5.8)
SODIUM BLD-SCNC: 139 MMOL/L (ref 132–146)
TOTAL PROTEIN: 6.8 G/DL (ref 6.4–8.3)
WBC # BLD: 4.5 E9/L (ref 4.5–11.5)

## 2021-06-25 PROCEDURE — 1036F TOBACCO NON-USER: CPT | Performed by: FAMILY MEDICINE

## 2021-06-25 PROCEDURE — G8417 CALC BMI ABV UP PARAM F/U: HCPCS | Performed by: FAMILY MEDICINE

## 2021-06-25 PROCEDURE — G8427 DOCREV CUR MEDS BY ELIG CLIN: HCPCS | Performed by: FAMILY MEDICINE

## 2021-06-25 PROCEDURE — 99214 OFFICE O/P EST MOD 30 MIN: CPT | Performed by: FAMILY MEDICINE

## 2021-06-25 RX ORDER — METHYLPREDNISOLONE 4 MG/1
TABLET ORAL
Qty: 1 KIT | Refills: 0 | Status: SHIPPED
Start: 2021-06-25 | End: 2021-07-15

## 2021-06-25 ASSESSMENT — ENCOUNTER SYMPTOMS
SHORTNESS OF BREATH: 0
FACIAL SWELLING: 0
BLOOD IN STOOL: 0
SORE THROAT: 0
ABDOMINAL PAIN: 0
WHEEZING: 0
PHOTOPHOBIA: 0
NAUSEA: 0
ALLERGIC/IMMUNOLOGIC NEGATIVE: 1
COLOR CHANGE: 0
BACK PAIN: 0
DIARRHEA: 0
APNEA: 0
CHEST TIGHTNESS: 0
COUGH: 0
VOMITING: 0
SINUS PRESSURE: 0

## 2021-06-25 ASSESSMENT — PATIENT HEALTH QUESTIONNAIRE - PHQ9
2. FEELING DOWN, DEPRESSED OR HOPELESS: 0
1. LITTLE INTEREST OR PLEASURE IN DOING THINGS: 0
SUM OF ALL RESPONSES TO PHQ QUESTIONS 1-9: 0
SUM OF ALL RESPONSES TO PHQ9 QUESTIONS 1 & 2: 0
SUM OF ALL RESPONSES TO PHQ QUESTIONS 1-9: 0
SUM OF ALL RESPONSES TO PHQ QUESTIONS 1-9: 0

## 2021-06-25 NOTE — PROGRESS NOTES
Chief Complaint:   Chief Complaint   Patient presents with    Neck Pain     went to ER on 6/23/21, for neck pain, dizzy, light headed, had CT and said something was found on chest/shoulder       Neck Pain   This is a new problem. The current episode started in the past 7 days. The problem occurs daily. The problem has been unchanged. The pain is present in the midline. The quality of the pain is described as aching. The pain is at a severity of 3/10. The pain is mild. Pertinent negatives include no chest pain, headaches, photophobia or weakness. Dizziness  This is a new problem. The current episode started in the past 7 days. The problem occurs daily. Associated symptoms include neck pain and vertigo. Pertinent negatives include no abdominal pain, arthralgias, chest pain, congestion, coughing, headaches, joint swelling, myalgias, nausea, rash, sore throat, vomiting or weakness. There is no problem list on file for this patient. No past medical history on file. No past surgical history on file. Current Outpatient Medications   Medication Sig Dispense Refill    methylPREDNISolone (MEDROL, SHINE,) 4 MG tablet Take by mouth. 1 kit 0    butalbital-APAP-caffeine (FIORICET) -40 MG CAPS per capsule Take 1 capsule by mouth every 4 hours as needed for Headaches 30 capsule 0    CLARITIN-D 12 HOUR 5-120 MG per extended release tablet Take 1 tablet by mouth 2 times daily For congestion relief as needed. 20 tablet 0    meclizine (ANTIVERT) 25 MG tablet Take 1 tablet by mouth 3 times daily as needed for Dizziness 20 tablet 1    cyclobenzaprine (FLEXERIL) 10 MG tablet Take 1 tablet by mouth 3 times daily as needed for Muscle spasms /severe neck pain. No driving/working after taking this medicine--causes drowsiness. 20 tablet 0     No current facility-administered medications for this visit.        Allergies   Allergen Reactions    Pcn [Penicillins] Rash       Social History     Socioeconomic History  Marital status: Single     Spouse name: None    Number of children: None    Years of education: None    Highest education level: None   Occupational History    None   Tobacco Use    Smoking status: Never Smoker    Smokeless tobacco: Never Used   Substance and Sexual Activity    Alcohol use: No    Drug use: No    Sexual activity: None   Other Topics Concern    None   Social History Narrative    None     Social Determinants of Health     Financial Resource Strain:     Difficulty of Paying Living Expenses:    Food Insecurity:     Worried About Running Out of Food in the Last Year:     Ran Out of Food in the Last Year:    Transportation Needs:     Lack of Transportation (Medical):  Lack of Transportation (Non-Medical):    Physical Activity:     Days of Exercise per Week:     Minutes of Exercise per Session:    Stress:     Feeling of Stress :    Social Connections:     Frequency of Communication with Friends and Family:     Frequency of Social Gatherings with Friends and Family:     Attends Mormon Services:     Active Member of Clubs or Organizations:     Attends Club or Organization Meetings:     Marital Status:    Intimate Partner Violence:     Fear of Current or Ex-Partner:     Emotionally Abused:     Physically Abused:     Sexually Abused:        No family history on file. Review of Systems   Constitutional: Negative. HENT: Negative for congestion, facial swelling, hearing loss, nosebleeds, sinus pressure and sore throat. Eyes: Negative for photophobia and visual disturbance. Respiratory: Negative for apnea, cough, chest tightness, shortness of breath and wheezing. Cardiovascular: Negative for chest pain, palpitations and leg swelling. Gastrointestinal: Negative for abdominal pain, blood in stool, diarrhea, nausea and vomiting. Genitourinary: Negative for difficulty urinating, frequency and urgency. Musculoskeletal: Positive for neck pain.  Negative for arthralgias, back pain, joint swelling and myalgias. Skin: Negative for color change and rash. Allergic/Immunologic: Negative. Neurological: Positive for dizziness and vertigo. Negative for syncope, weakness, light-headedness and headaches. Hematological: Negative. Psychiatric/Behavioral: Negative for agitation, behavioral problems, confusion and self-injury. The patient is not nervous/anxious. All other systems reviewed and are negative. Physical Exam  Vitals and nursing note reviewed. Constitutional:       General: He is not in acute distress. Appearance: He is well-developed. HENT:      Head: Normocephalic and atraumatic. Nose: Nose normal.   Eyes:      Conjunctiva/sclera: Conjunctivae normal.      Pupils: Pupils are equal, round, and reactive to light. Neck:      Thyroid: No thyromegaly. Vascular: No JVD. Cardiovascular:      Rate and Rhythm: Normal rate and regular rhythm. Heart sounds: Normal heart sounds. No murmur heard. No friction rub. No gallop. Pulmonary:      Effort: Pulmonary effort is normal. No respiratory distress. Breath sounds: Normal breath sounds. No wheezing. Abdominal:      General: Bowel sounds are normal. There is no distension. Palpations: Abdomen is soft. Tenderness: There is no abdominal tenderness. There is no guarding or rebound. Musculoskeletal:         General: Normal range of motion. Cervical back: Normal range of motion and neck supple. Tenderness present. Lymphadenopathy:      Cervical: No cervical adenopathy. Skin:     General: Skin is warm and dry. Findings: No erythema or rash. Neurological:      Mental Status: He is alert and oriented to person, place, and time. Cranial Nerves: No cranial nerve deficit. Motor: No abnormal muscle tone. Coordination: Coordination normal.      Deep Tendon Reflexes: Reflexes are normal and symmetric.    Psychiatric:         Behavior: Behavior normal.         Judgment: Judgment normal.                               ASSESSMENT/PLAN:    Dacula Meredith was seen today for neck pain. Diagnoses and all orders for this visit:    DDD (degenerative disc disease), cervical  -     methylPREDNISolone (MEDROL, SHINE,) 4 MG tablet; Take by mouth. Dizziness  -     Comprehensive Metabolic Panel;  Future  -     CBC Auto Differential; Future    continue marisa Rehman DO    6/25/2021  1:13 PM

## 2021-06-28 ENCOUNTER — TELEPHONE (OUTPATIENT)
Dept: PRIMARY CARE CLINIC | Age: 26
End: 2021-06-28

## 2021-06-28 NOTE — TELEPHONE ENCOUNTER
Patient was recently seen for vertigo.  He has his CDL and drives truck, wants to know if theres anything he can take when hes driving if this happens

## 2021-07-02 ENCOUNTER — TELEPHONE (OUTPATIENT)
Dept: PRIMARY CARE CLINIC | Age: 26
End: 2021-07-02

## 2021-07-02 NOTE — TELEPHONE ENCOUNTER
Pt calling asking if you can give him a RTW note stating he can go back on 7/6. He was dx with vertigo and is a . email [email or web address]

## 2021-07-02 NOTE — LETTER
[address]  Caitlin PINEDA [address]  Phone: [phone]  Fax: [facility]        July 6, 2021     Patient: Shaquille Earl   YOB: 1995   Date of Visit: 7/2/2021       To Whom It May Concern: It is my medical opinion that Shaquille Earl may return to full duty immediately with no restrictions. If you have any questions or concerns, please don't hesitate to call.     Sincerely,        Shayne Valdivia DO

## 2021-07-02 NOTE — LETTER
[address]  Lalo PINEDA [address]  Phone: [phone]  Fax: [facility], DO        July 6, 2021     Patient: Gloria Dunn   YOB: 1995   Date of Visit: 7/2/2021       To Whom It May Concern: It is my medical opinion that Gloria Dunn has diagnosis of benign positional vertigo . He is scheduled for a referral to an ENT specialist. He should remain out of work until seen by the specialist..     If you have any questions or concerns, please don't hesitate to call.     Sincerely,        Desirae Ritchie DO

## 2021-07-06 ENCOUNTER — TELEPHONE (OUTPATIENT)
Dept: PRIMARY CARE CLINIC | Age: 26
End: 2021-07-06

## 2021-07-06 DIAGNOSIS — R42 DIZZINESS: Primary | ICD-10-CM

## 2021-07-06 NOTE — TELEPHONE ENCOUNTER
The pt has been dealing with some vertigo and with him being a  he would like to see a specialist about it.  He is calling to see if you can please refer him to see someone

## 2021-07-06 NOTE — TELEPHONE ENCOUNTER
Pt is currently at work and can't work because he needs this sent over to that e-mail as soon as possible please.

## 2021-07-06 NOTE — TELEPHONE ENCOUNTER
Patient states that his work will not let him work with the dx of benign positional vertigo unless he is episode free for 2 months. Pt states that the note needs to list his diagnosis, and any restrictions.

## 2021-07-12 ENCOUNTER — OFFICE VISIT (OUTPATIENT)
Dept: ENT CLINIC | Age: 26
End: 2021-07-12
Payer: COMMERCIAL

## 2021-07-12 VITALS
HEIGHT: 76 IN | WEIGHT: 230 LBS | DIASTOLIC BLOOD PRESSURE: 79 MMHG | SYSTOLIC BLOOD PRESSURE: 130 MMHG | HEART RATE: 102 BPM | BODY MASS INDEX: 28.01 KG/M2

## 2021-07-12 DIAGNOSIS — R42 DIZZINESS OF UNKNOWN ETIOLOGY: Primary | ICD-10-CM

## 2021-07-12 PROCEDURE — G8417 CALC BMI ABV UP PARAM F/U: HCPCS | Performed by: OTOLARYNGOLOGY

## 2021-07-12 PROCEDURE — G8427 DOCREV CUR MEDS BY ELIG CLIN: HCPCS | Performed by: OTOLARYNGOLOGY

## 2021-07-12 PROCEDURE — 4004F PT TOBACCO SCREEN RCVD TLK: CPT | Performed by: OTOLARYNGOLOGY

## 2021-07-12 PROCEDURE — 99203 OFFICE O/P NEW LOW 30 MIN: CPT | Performed by: OTOLARYNGOLOGY

## 2021-07-12 NOTE — PROGRESS NOTES
All other systems reviewed and are negative. /79   Pulse 102   Ht 6' 4\" (1.93 m)   Wt 230 lb (104.3 kg)   BMI 28.00 kg/m²   Physical Exam  Vitals and nursing note reviewed. Constitutional:       Appearance: He is well-developed. HENT:      Head: Normocephalic and atraumatic. Right Ear: Tympanic membrane and ear canal normal.      Left Ear: Tympanic membrane and ear canal normal.      Nose: Congestion and rhinorrhea present. Mouth/Throat:      Mouth: Mucous membranes are dry. Eyes:      Pupils: Pupils are equal, round, and reactive to light. Neck:      Thyroid: No thyromegaly. Trachea: No tracheal deviation. Cardiovascular:      Rate and Rhythm: Normal rate. Pulmonary:      Effort: Pulmonary effort is normal. No respiratory distress. Musculoskeletal:         General: Normal range of motion. Cervical back: Normal range of motion. Lymphadenopathy:      Cervical: No cervical adenopathy. Skin:     General: Skin is warm. Findings: No erythema. Neurological:      Mental Status: He is alert. Cranial Nerves: No cranial nerve deficit. IMPRESSION/PLAN:  She is seen and examined, physical exam findings as well as Jagdish-Hallpike are consistent with nonperipheral disequilibrium likely related to positional changes secondary to orthostatic hypotension, recommend the patient increase hydration, and follow-up with his PCP for further evaluation. Dr. Juanpablo Cole Otolaryngology/Facial Plastic Surgery Residency  Associate Clinical Professor:  Patrick Cowart, [facility]

## 2021-07-15 ENCOUNTER — OFFICE VISIT (OUTPATIENT)
Dept: PRIMARY CARE CLINIC | Age: 26
End: 2021-07-15
Payer: COMMERCIAL

## 2021-07-15 VITALS
OXYGEN SATURATION: 98 % | TEMPERATURE: 97.8 F | SYSTOLIC BLOOD PRESSURE: 128 MMHG | DIASTOLIC BLOOD PRESSURE: 80 MMHG | HEART RATE: 74 BPM | WEIGHT: 222.8 LBS | BODY MASS INDEX: 27.12 KG/M2

## 2021-07-15 DIAGNOSIS — M50.30 DDD (DEGENERATIVE DISC DISEASE), CERVICAL: ICD-10-CM

## 2021-07-15 DIAGNOSIS — R42 DIZZINESS: Primary | ICD-10-CM

## 2021-07-15 DIAGNOSIS — R27.0 ATAXIA: ICD-10-CM

## 2021-07-15 PROCEDURE — 4004F PT TOBACCO SCREEN RCVD TLK: CPT | Performed by: FAMILY MEDICINE

## 2021-07-15 PROCEDURE — G8417 CALC BMI ABV UP PARAM F/U: HCPCS | Performed by: FAMILY MEDICINE

## 2021-07-15 PROCEDURE — G8427 DOCREV CUR MEDS BY ELIG CLIN: HCPCS | Performed by: FAMILY MEDICINE

## 2021-07-15 PROCEDURE — 99213 OFFICE O/P EST LOW 20 MIN: CPT | Performed by: FAMILY MEDICINE

## 2021-07-15 SDOH — ECONOMIC STABILITY: FOOD INSECURITY: WITHIN THE PAST 12 MONTHS, YOU WORRIED THAT YOUR FOOD WOULD RUN OUT BEFORE YOU GOT MONEY TO BUY MORE.: NEVER TRUE

## 2021-07-15 SDOH — ECONOMIC STABILITY: FOOD INSECURITY: WITHIN THE PAST 12 MONTHS, THE FOOD YOU BOUGHT JUST DIDN'T LAST AND YOU DIDN'T HAVE MONEY TO GET MORE.: NEVER TRUE

## 2021-07-15 ASSESSMENT — ENCOUNTER SYMPTOMS
ABDOMINAL PAIN: 0
VOMITING: 0
CHEST TIGHTNESS: 0
SHORTNESS OF BREATH: 0
BLOOD IN STOOL: 0
SORE THROAT: 0
EYE PAIN: 0
SINUS PRESSURE: 0
APNEA: 0
PHOTOPHOBIA: 0
CONSTIPATION: 0
DIARRHEA: 0
WHEEZING: 0
COUGH: 0
NAUSEA: 0
COLOR CHANGE: 0
RHINORRHEA: 0
EYE ITCHING: 0
EYE REDNESS: 0
BACK PAIN: 0

## 2021-07-15 ASSESSMENT — SOCIAL DETERMINANTS OF HEALTH (SDOH): HOW HARD IS IT FOR YOU TO PAY FOR THE VERY BASICS LIKE FOOD, HOUSING, MEDICAL CARE, AND HEATING?: NOT HARD AT ALL

## 2021-07-15 NOTE — PROGRESS NOTES
Chief Complaint:     Chief Complaint   Patient presents with    Dizziness         Dizziness  This is a new problem. The current episode started in the past 7 days. The problem occurs daily. Associated symptoms include neck pain and vertigo. Pertinent negatives include no abdominal pain, arthralgias, chest pain, congestion, coughing, fatigue, fever, headaches, joint swelling, myalgias, nausea, numbness, rash, sore throat, vomiting or weakness. Neck Pain   This is a new problem. The current episode started in the past 7 days. The problem occurs daily. The problem has been unchanged. The pain is present in the midline. The quality of the pain is described as aching. The pain is at a severity of 3/10. The pain is mild. Pertinent negatives include no chest pain, fever, headaches, numbness, photophobia or weakness. There is no problem list on file for this patient. History reviewed. No pertinent past medical history. History reviewed. No pertinent surgical history. No current outpatient medications on file. No current facility-administered medications for this visit.        Allergies   Allergen Reactions    Pcn [Penicillins] Rash       Social History     Socioeconomic History    Marital status: Single     Spouse name: None    Number of children: None    Years of education: None    Highest education level: None   Occupational History    None   Tobacco Use    Smoking status: Never Smoker    Smokeless tobacco: Current User     Types: Chew    Tobacco comment: rarely   Substance and Sexual Activity    Alcohol use: Yes     Comment: social    Drug use: No    Sexual activity: None   Other Topics Concern    None   Social History Narrative    None     Social Determinants of Health     Financial Resource Strain: Low Risk     Difficulty of Paying Living Expenses: Not hard at all   Food Insecurity: No Food Insecurity    Worried About Running Out of Food in the Last Year: Never true    Nikki of Food in the Last Year: Never true   Transportation Needs:     Lack of Transportation (Medical):  Lack of Transportation (Non-Medical):    Physical Activity:     Days of Exercise per Week:     Minutes of Exercise per Session:    Stress:     Feeling of Stress :    Social Connections:     Frequency of Communication with Friends and Family:     Frequency of Social Gatherings with Friends and Family:     Attends Roman Catholic Services:     Active Member of Clubs or Organizations:     Attends Club or Organization Meetings:     Marital Status:    Intimate Partner Violence:     Fear of Current or Ex-Partner:     Emotionally Abused:     Physically Abused:     Sexually Abused:        History reviewed. No pertinent family history. Review of Systems   Constitutional: Negative for activity change, appetite change, fatigue and fever. HENT: Negative for congestion, ear pain, hearing loss, nosebleeds, rhinorrhea, sinus pressure and sore throat. Eyes: Negative for photophobia, pain, redness, itching and visual disturbance. Respiratory: Negative for apnea, cough, chest tightness, shortness of breath and wheezing. Cardiovascular: Negative for chest pain, palpitations and leg swelling. Gastrointestinal: Negative for abdominal pain, blood in stool, constipation, diarrhea, nausea and vomiting. Endocrine: Negative. Genitourinary: Negative for decreased urine volume, difficulty urinating, dysuria, frequency, hematuria and urgency. Musculoskeletal: Positive for gait problem and neck pain. Negative for arthralgias, back pain, joint swelling and myalgias. Skin: Negative for color change and rash. Allergic/Immunologic: Negative for environmental allergies and food allergies. Neurological: Positive for dizziness and vertigo. Negative for weakness, light-headedness, numbness and headaches. Hematological: Negative for adenopathy. Does not bruise/bleed easily.    Psychiatric/Behavioral: Negative for behavioral problems, dysphoric mood and sleep disturbance. The patient is not nervous/anxious and is not hyperactive. All other systems reviewed and are negative. /80 (Site: Right Upper Arm, Position: Sitting)   Pulse 74   Temp 97.8 °F (36.6 °C) (Temporal)   Wt 222 lb 12.8 oz (101.1 kg)   SpO2 98%   BMI 27.12 kg/m²     Physical Exam  Vitals and nursing note reviewed. Constitutional:       General: He is not in acute distress. Appearance: Normal appearance. He is well-developed. HENT:      Head: Normocephalic and atraumatic. Right Ear: Hearing, tympanic membrane and external ear normal. No tenderness. No middle ear effusion. Left Ear: Hearing, tympanic membrane and external ear normal. No tenderness. No middle ear effusion. Nose: Nose normal. No congestion or rhinorrhea. Right Turbinates: Not enlarged. Left Turbinates: Not enlarged. Mouth/Throat:      Mouth: Mucous membranes are moist.      Tongue: No lesions. Pharynx: Oropharynx is clear. No oropharyngeal exudate or posterior oropharyngeal erythema. Eyes:      General: No scleral icterus. Conjunctiva/sclera: Conjunctivae normal.      Pupils: Pupils are equal, round, and reactive to light. Neck:      Thyroid: No thyromegaly. Cardiovascular:      Rate and Rhythm: Normal rate and regular rhythm. Heart sounds: Normal heart sounds. No murmur heard. Pulmonary:      Effort: Pulmonary effort is normal. No respiratory distress. Breath sounds: Normal breath sounds. No wheezing or rales. Abdominal:      General: Bowel sounds are normal. There is no distension. Palpations: Abdomen is soft. Tenderness: There is no abdominal tenderness. Musculoskeletal:         General: No tenderness. Normal range of motion. Cervical back: Normal range of motion and neck supple. No rigidity. No muscular tenderness. Lymphadenopathy:      Cervical: No cervical adenopathy.    Skin: General: Skin is warm and dry. Findings: No erythema or rash. Neurological:      General: No focal deficit present. Mental Status: He is alert and oriented to person, place, and time. Cranial Nerves: No cranial nerve deficit. Gait: Gait abnormal.      Deep Tendon Reflexes: Reflexes are normal and symmetric. Reflexes normal.   Psychiatric:         Mood and Affect: Mood normal.                                 ASSESSMENT/PLAN:    There is no problem list on file for this patient. Kristian Lindsey was seen today for dizziness. Diagnoses and all orders for this visit:    Dizziness  -     MRI BRAIN WO CONTRAST; Future    DDD (degenerative disc disease), cervical    Ataxia  -     MRI BRAIN WO CONTRAST; Future          Return in about 2 weeks (around 7/29/2021) for recheck . I spent 15 minutes with this patient. I spent greater than 50% of the time counseling this patient.         Miranda Orourke DO  7/15/2021  11:14 AM

## 2021-07-23 ENCOUNTER — HOSPITAL ENCOUNTER (OUTPATIENT)
Dept: MRI IMAGING | Age: 26
Discharge: HOME | End: 2021-07-25
Payer: COMMERCIAL

## 2021-07-23 DIAGNOSIS — R27.0 ATAXIA: ICD-10-CM

## 2021-07-23 DIAGNOSIS — R42 DIZZINESS: ICD-10-CM

## 2021-07-23 PROCEDURE — 70551 MRI BRAIN STEM W/O DYE: CPT

## 2021-07-26 ENCOUNTER — NURSE TRIAGE (OUTPATIENT)
Dept: OTHER | Facility: CLINIC | Age: 26
End: 2021-07-26

## 2021-07-26 ENCOUNTER — TELEPHONE (OUTPATIENT)
Dept: PRIMARY CARE CLINIC | Age: 26
End: 2021-07-26

## 2021-07-26 NOTE — TELEPHONE ENCOUNTER
Reason for Disposition   Requesting regular office appointment    Answer Assessment - Initial Assessment Questions  1. REASON FOR CALL or QUESTION: \"What is your reason for calling today? \" or \"How can I best help you? \" or \"What question do you have that I can help answer? \"      Sasha Crabtree wants to schedule regular appt after MRI has been done. States MD said MRI was normal and pt does not know what to do now. Warm transfer to [facility] to schedule with PCP.     Protocols used: INFORMATION ONLY CALL - NO TRIAGE-[facility]ULTKet[facility]

## 2021-07-26 NOTE — TELEPHONE ENCOUNTER
----- Message from Radha Llanos sent at 7/26/2021  2:10 PM EDT -----  Subject: Message to Provider    QUESTIONS  Information for Provider? Per Wilhelmina Libman from [facility]. Pt is taking muscle   relaxers. Pt states they are not working. Would like to see if there is   anything else he can take for pain.   ---------------------------------------------------------------------------  --------------  CALL BACK INFO  What is the best way for the office to contact you? OK to leave message on   voicemail  Preferred Call Back Phone Number? [phone]  ---------------------------------------------------------------------------  --------------  SCRIPT ANSWERS  Relationship to Patient? Third Party  Representative Name?  Wilhelmina Libman

## 2021-07-26 NOTE — TELEPHONE ENCOUNTER
Per Gumaro Buitrago from [facility]. Pt is taking muscle   relaxers. Pt states they are not working. Would like to see if there is   anything else he can take for pain.

## 2021-07-28 ENCOUNTER — OFFICE VISIT (OUTPATIENT)
Dept: FAMILY MEDICINE CLINIC | Age: 26
End: 2021-07-28
Payer: COMMERCIAL

## 2021-07-28 VITALS
OXYGEN SATURATION: 99 % | HEIGHT: 76 IN | WEIGHT: 222 LBS | RESPIRATION RATE: 16 BRPM | TEMPERATURE: 97.3 F | DIASTOLIC BLOOD PRESSURE: 80 MMHG | BODY MASS INDEX: 27.03 KG/M2 | HEART RATE: 100 BPM | SYSTOLIC BLOOD PRESSURE: 124 MMHG

## 2021-07-28 DIAGNOSIS — R42 DIZZINESS: Primary | ICD-10-CM

## 2021-07-28 DIAGNOSIS — R27.0 ATAXIA: ICD-10-CM

## 2021-07-28 PROCEDURE — G8417 CALC BMI ABV UP PARAM F/U: HCPCS | Performed by: PHYSICIAN ASSISTANT

## 2021-07-28 PROCEDURE — 99214 OFFICE O/P EST MOD 30 MIN: CPT | Performed by: PHYSICIAN ASSISTANT

## 2021-07-28 PROCEDURE — G8427 DOCREV CUR MEDS BY ELIG CLIN: HCPCS | Performed by: PHYSICIAN ASSISTANT

## 2021-07-28 PROCEDURE — 4004F PT TOBACCO SCREEN RCVD TLK: CPT | Performed by: PHYSICIAN ASSISTANT

## 2021-07-28 NOTE — PROGRESS NOTES
21  Luke Avery : 1995 Sex: male  Age 23 y.o. Subjective:  Chief Complaint   Patient presents with    Neck Pain     neck pain for over a month, worse with movement          HPI:   Luke Avery , 23 y.o. male presents to express care for evaluation of neck pain, dizziness, ataxia    HPI  21-year-old male with no significant past medical conditions presents to express care for evaluation and essentially follow-up from dizziness, ataxia, and neck pain. The patient has been seen and evaluated for this multiple times over the course of the last 1 month. The patient went to the emergency department on 2021 with complaints of a week's worth of neck pain, fatigue, dizziness. The patient had also had some intermittent nausea and vomiting. The symptoms have been somewhat persistent and did not really change. The patient had CT scan of the head and of the neck that did not show any significant abnormalities. See below results. The patient followed up with PCP and was given a Medrol Dosepak. The patient despite being on steroids and muscle relaxants have not had much improvement. The patient then went to ENT on 2021 and was evaluated for the possibility of vertigo but was felt to have more orthostatic findings. The patient then followed up with PCP once again and had an MRI of the brain which was essentially unremarkable. The patient is still having these complaints. The patient states that at times he will have this pain in his neck. He was asked by ENT to describe it without using the word dizziness and he feels that he loses coordination with certain movements and certain activities.       ROS:   Unless otherwise stated in this report the patient's positive and negative responses for review of systems for constitutional, eyes, ENT, cardiovascular, respiratory, gastrointestinal, neurological, , musculoskeletal, and integument systems and related systems to the presenting problem are either stated in the history of present illness or were not pertinent or were negative for the symptoms and/or complaints related to the presenting medical problem. Positives and pertinent negatives as per HPI. All others reviewed and are negative. PMH:   History reviewed. No pertinent past medical history. History reviewed. No pertinent surgical history. History reviewed. No pertinent family history. Medications:   No current outpatient medications on file. Allergies: Allergies   Allergen Reactions    Pcn [Penicillins] Rash       Social History:     Social History     Tobacco Use    Smoking status: Never Smoker    Smokeless tobacco: Current User     Types: Chew    Tobacco comment: rarely   Substance Use Topics    Alcohol use: Yes     Comment: social    Drug use: No       Patient lives at home. Physical Exam:     Vitals:    07/28/21 1609   BP: 124/80   Pulse: 100   Resp: 16   Temp: 97.3 °F (36.3 °C)   SpO2: 99%   Weight: 222 lb (100.7 kg)   Height: 6' 4\" (1.93 m)       Exam:  Physical Exam  Nurses note and vital signs reviewed and patient is not hypoxic. General: The patient appears well and in no apparent distress. Patient is resting comfortably on cart. Skin: Warm, dry, no pallor noted. There is no rash noted. Head: Normocephalic, atraumatic. Eye: Normal conjunctiva  Ears, Nose, Mouth, and Throat: Oral mucosa is moist  Neck: There is no significant reproducible tenderness to the neck. There is no step-offs or crepitus. No specific midline tenderness although the patient states that this is the area of pain  Cardiovascular: Regular Rate and Rhythm  Respiratory: Patient is in no distress, no accessory muscle use, lungs are clear to auscultation, no wheezing, crackles or rhonchi  Back: Non-tender, no CVA tenderness bilaterally to percussion. GI: Normal bowel sounds, no tenderness to palpation, no masses appreciated.  No rebound, guarding, or rigidity noted.  Musculoskeletal: The patient has no evidence of calf tenderness, no pitting edema, symmetrical pulses noted bilaterally  Neurological: A&O x4, normal speech, normal motor, normal sensory, normal gait    Testing:     MRI BRAIN WO CONTRAST    Result Date: 7/23/2021  EXAMINATION: MRI OF THE BRAIN WITHOUT CONTRAST  7/23/2021 6:40 pm TECHNIQUE: Multiplanar multisequence MRI of the brain was performed without the administration of intravenous contrast. COMPARISON: Head CT of 06/23/2021 HISTORY: ORDERING SYSTEM PROVIDED HISTORY: Dizziness TECHNOLOGIST PROVIDED HISTORY: Reason for exam:->other FINDINGS: INTRACRANIAL STRUCTURES/VENTRICLES: There is no acute infarct. No mass effect or midline shift. No evidence of an acute intracranial hemorrhage. The ventricles and sulci are normal in size and configuration. The sellar/suprasellar regions appear unremarkable. The normal signal voids within the major intracranial vessels appear maintained. ORBITS: The visualized portion of the orbits demonstrate no acute abnormality. SINUSES: The visualized paranasal sinuses and mastoid air cells are well aerated. BONES/SOFT TISSUES: The bone marrow signal intensity appears normal. The soft tissues demonstrate no acute abnormality. Unremarkable brain MRI. No acute infarction, intracranial hemorrhage or mass lesion. EXAMINATION:   CT OF THE CERVICAL SPINE WITHOUT CONTRAST 6/23/2021 11:17 pm       TECHNIQUE:   CT of the cervical spine was performed without the administration of   intravenous contrast. Multiplanar reformatted images are provided for review.    Dose modulation, iterative reconstruction, and/or weight based adjustment of   the mA/kV was utilized to reduce the radiation dose to as low as reasonably   achievable.       COMPARISON:   None.       HISTORY:   ORDERING SYSTEM PROVIDED HISTORY: neck pain   TECHNOLOGIST PROVIDED HISTORY:   Reason for exam:->neck pain   Decision Support Exception - unselect if not a suspected or confirmed   emergency medical condition->Emergency Medical Condition (MA)       FINDINGS:   BONES/ALIGNMENT: There is no acute fracture or traumatic malalignment.       DEGENERATIVE CHANGES: Small broad-based posterior disc osteophyte complex is   noted at C5-C6.  There is central canal stenosis at any level.  MRI may be   obtained if clinically indicated.  Neural foraminal narrowing or       SOFT TISSUES: There is no prevertebral soft tissue swelling.  An air bubble   is seen in the soft tissues of the left upper hemithorax anteriorly.  There   is no apical pneumothorax.           Impression   No acute fracture or subluxation in the cervical spine.       Small broad-based posterior disc osteophyte complex at C5-C6.       No definite central canal stenosis or neural foraminal narrowing at any level.       An air bubble in the soft tissues of the left upper hemithorax anteriorly,   which may be in a venous structure and related to recent vascular access   attempt. EXAMINATION:   CT OF THE HEAD WITHOUT CONTRAST  6/23/2021 11:16 pm       TECHNIQUE:   CT of the head was performed without the administration of intravenous   contrast. Dose modulation, iterative reconstruction, and/or weight based   adjustment of the mA/kV was utilized to reduce the radiation dose to as low   as reasonably achievable.       COMPARISON:   None.       HISTORY:   ORDERING SYSTEM PROVIDED HISTORY: headache   TECHNOLOGIST PROVIDED HISTORY:   Reason for exam:->headache   Reason for exam:->neck pain   Has a \"code stroke\" or \"stroke alert\" been called? ->No   Decision Support Exception - unselect if not a suspected or confirmed   emergency medical condition->Emergency Medical Condition (MA)       FINDINGS:   BRAIN/VENTRICLES: There is no acute intracranial hemorrhage, mass effect or   midline shift.  No abnormal extra-axial fluid collection.  The gray-white   differentiation is maintained without evidence of an acute infarct. follow-up with PCP in the next 2-3 days for repeat evaluation repeat assessment or go directly to the emergency department.      SIGNATURE: Ashley Basilio III, PA-C

## 2021-07-29 DIAGNOSIS — R42 DIZZINESS: ICD-10-CM

## 2021-07-29 LAB
ALBUMIN SERPL-MCNC: 4.7 G/DL (ref 3.5–5.2)
ALP BLD-CCNC: 79 U/L (ref 40–129)
ALT SERPL-CCNC: 22 U/L (ref 0–40)
ANION GAP SERPL CALCULATED.3IONS-SCNC: 11 MMOL/L (ref 7–16)
AST SERPL-CCNC: 19 U/L (ref 0–39)
BASOPHILS ABSOLUTE: 0.03 E9/L (ref 0–0.2)
BASOPHILS RELATIVE PERCENT: 0.7 % (ref 0–2)
BILIRUB SERPL-MCNC: 0.5 MG/DL (ref 0–1.2)
BUN BLDV-MCNC: 20 MG/DL (ref 6–20)
CALCIUM SERPL-MCNC: 9.6 MG/DL (ref 8.6–10.2)
CHLORIDE BLD-SCNC: 107 MMOL/L (ref 98–107)
CO2: 36 MMOL/L (ref 22–29)
CREAT SERPL-MCNC: 1.1 MG/DL (ref 0.7–1.2)
EOSINOPHILS ABSOLUTE: 0.11 E9/L (ref 0.05–0.5)
EOSINOPHILS RELATIVE PERCENT: 2.5 % (ref 0–6)
GFR AFRICAN AMERICAN: >60
GFR NON-AFRICAN AMERICAN: >60 ML/MIN/1.73
GLUCOSE BLD-MCNC: 84 MG/DL (ref 74–99)
HCT VFR BLD CALC: 45.6 % (ref 37–54)
HEMOGLOBIN: 15.5 G/DL (ref 12.5–16.5)
IMMATURE GRANULOCYTES #: 0.01 E9/L
IMMATURE GRANULOCYTES %: 0.2 % (ref 0–5)
LYMPHOCYTES ABSOLUTE: 1.76 E9/L (ref 1.5–4)
LYMPHOCYTES RELATIVE PERCENT: 40.3 % (ref 20–42)
MAGNESIUM: 2.1 MG/DL (ref 1.6–2.6)
MCH RBC QN AUTO: 29.8 PG (ref 26–35)
MCHC RBC AUTO-ENTMCNC: 34 % (ref 32–34.5)
MCV RBC AUTO: 87.5 FL (ref 80–99.9)
MONOCYTES ABSOLUTE: 0.35 E9/L (ref 0.1–0.95)
MONOCYTES RELATIVE PERCENT: 8 % (ref 2–12)
NEUTROPHILS ABSOLUTE: 2.11 E9/L (ref 1.8–7.3)
NEUTROPHILS RELATIVE PERCENT: 48.3 % (ref 43–80)
PDW BLD-RTO: 12.3 FL (ref 11.5–15)
PLATELET # BLD: 146 E9/L (ref 130–450)
PMV BLD AUTO: 11.5 FL (ref 7–12)
POTASSIUM SERPL-SCNC: 4.4 MMOL/L (ref 3.5–5)
RBC # BLD: 5.21 E12/L (ref 3.8–5.8)
SODIUM BLD-SCNC: 154 MMOL/L (ref 132–146)
TOTAL PROTEIN: 7.2 G/DL (ref 6.4–8.3)
TSH SERPL DL<=0.05 MIU/L-ACNC: 1.4 UIU/ML (ref 0.27–4.2)
VITAMIN B-12: 825 PG/ML (ref 211–946)
WBC # BLD: 4.4 E9/L (ref 4.5–11.5)

## 2021-07-29 ASSESSMENT — ENCOUNTER SYMPTOMS
COUGH: 0
VOMITING: 0
SHORTNESS OF BREATH: 0
SORE THROAT: 0

## 2021-08-02 ENCOUNTER — OFFICE VISIT (OUTPATIENT)
Dept: NEUROLOGY | Age: 26
End: 2021-08-02
Payer: COMMERCIAL

## 2021-08-02 VITALS
BODY MASS INDEX: 27.03 KG/M2 | SYSTOLIC BLOOD PRESSURE: 120 MMHG | HEIGHT: 76 IN | DIASTOLIC BLOOD PRESSURE: 70 MMHG | WEIGHT: 222 LBS

## 2021-08-02 DIAGNOSIS — R42 DIZZINESS AND GIDDINESS: ICD-10-CM

## 2021-08-02 DIAGNOSIS — M99.01 CERVICAL (NECK) REGION SOMATIC DYSFUNCTION: ICD-10-CM

## 2021-08-02 DIAGNOSIS — M62.838 CERVICAL PARASPINAL MUSCLE SPASM: Chronic | ICD-10-CM

## 2021-08-02 DIAGNOSIS — R42 LIGHTHEADED: Primary | ICD-10-CM

## 2021-08-02 PROCEDURE — 99203 OFFICE O/P NEW LOW 30 MIN: CPT | Performed by: PSYCHIATRY & NEUROLOGY

## 2021-08-02 PROCEDURE — G8417 CALC BMI ABV UP PARAM F/U: HCPCS | Performed by: PSYCHIATRY & NEUROLOGY

## 2021-08-02 PROCEDURE — G8427 DOCREV CUR MEDS BY ELIG CLIN: HCPCS | Performed by: PSYCHIATRY & NEUROLOGY

## 2021-08-02 PROCEDURE — 4004F PT TOBACCO SCREEN RCVD TLK: CPT | Performed by: PSYCHIATRY & NEUROLOGY

## 2021-08-02 ASSESSMENT — ENCOUNTER SYMPTOMS
ALLERGIC/IMMUNOLOGIC NEGATIVE: 1
GASTROINTESTINAL NEGATIVE: 1
RESPIRATORY NEGATIVE: 1

## 2021-08-02 NOTE — PROGRESS NOTES
Neurology Consult Note:    Patient: Cristóbal Wadsworth  : 1995  Date: 21  Referring provider: SKYE Zapata    Referral to Neurology: episodic lightheadedness, also reports 1 month hx of posterior neck pain (in center). Cc: Reports history of one episode of spinning vertigo sensation, onset end- x 1 min. Dear SKYE Zapata:    Thank you for your referral of Cristóbal Wadsworth to the Neurology clinic, an alert, anxious 81-year-old man reporting a history of intermittent lightheadedness without postural or positional component and one episode of spinning vertigo sensation at the onset which occurred at the end of  lasting for about 1 minute. He does have a history of chronic tinnitus affecting the left greater than right ears and a childhood history of chronic ear infections. He reports he was seen by ENT who could find no specific ENT diagnosis to explain his symptoms. Fortunately, his MR brain scan showed no focal intracranial abnormalities. He denies hearing loss or otalgia. There are no focal neurologic associated symptoms such as hemiparesis, hemisensory loss, facial droop, visual loss, slurred speech, confusion, dysphagia, tremor, gait ataxia, headache, nausea or vomiting, or loss of consciousness. He also has complaints of posterior midline cervical pain with paraspinal muscle spasm, increased with head flexion/extension but not sideways or lateral bending. He denies cervical radicular symptoms affecting his arms. There is no recent or prior history of neck/head trauma. Lab Data: Reviewed from 21, sodium 154. Imaging Data: MR brain scan, 21, wnl.    , CT cervical spine 21: small broad-based disc osteophyte complex at C5/6; no canal stenosis, or neural foraminal narrowing. ED office visit note reviewed from 2021, [place]. ENT evaluation, 2021, possibility of vertigo, felt to have more orthostatic findings.     No current outpatient medications on file. No current facility-administered medications for this visit. Allergies   Allergen Reactions    Pcn [Penicillins] Rash       Patient Active Problem List   Diagnosis    Dizziness and giddiness    Cervical (neck) region somatic dysfunction    Cervical paraspinal muscle spasm       Past Medical History:   Diagnosis Date    Cervical (neck) region somatic dysfunction 8/2/2021    Cervical paraspinal muscle spasm 8/2/2021    Dizziness and giddiness 8/2/2021       No past surgical history on file. No family history on file. Social History     Socioeconomic History    Marital status: Single     Spouse name: Not on file    Number of children: Not on file    Years of education: Not on file    Highest education level: Not on file   Occupational History    Not on file   Tobacco Use    Smoking status: Never Smoker    Smokeless tobacco: Current User     Types: Chew    Tobacco comment: rarely   Substance and Sexual Activity    Alcohol use: Yes     Comment: social    Drug use: No    Sexual activity: Not on file   Other Topics Concern    Not on file   Social History Narrative    Not on file     Social Determinants of Health     Financial Resource Strain: Low Risk     Difficulty of Paying Living Expenses: Not hard at all   Food Insecurity: No Food Insecurity    Worried About Running Out of Food in the Last Year: Never true    [address] in the Last Year: Never true   Transportation Needs:     Lack of Transportation (Medical):      Lack of Transportation (Non-Medical):    Physical Activity:     Days of Exercise per Week:     Minutes of Exercise per Session:    Stress:     Feeling of Stress :    Social Connections:     Frequency of Communication with Friends and Family:     Frequency of Social Gatherings with Friends and Family:     Attends Zoroastrianism Services:     Active Member of Clubs or Organizations:     Attends Club or Organization Meetings: function of both hands, symmetric. DTR's: 78+ and symmetric in the upper and lower extremities, no ankle clonus, plantar responses are flexor. Negative Tinel's test to percussion over both wrists. Negative finger flexion and Claudia's reflexes. (No pathologic reflexes). Sensory: Grossly intact subjectively light touch and sharp stick testing throughout except for hyperpathia reported of the left upper extremity, no particular dermatomal distribution. Coordination/Gait: Grossly intact without limb dysmetria on finger-to-nose and heel-to-shin testing. No truncal or cerebellar gait ataxia, two-step turns. Able to ambulate on toes and heels without difficulty. Assessment/Plan:  1. Chronic intermittent dizziness and lightheadedness symptoms and one episode of spinning vertigo sensation which occurred at the end of June. Normal brain MRI imaging. History of chronic intermittent tinnitus and otitis in childhood. Symptoms were likely consistent with a peripheral vestibulopathy. 2.  History of chronic posterior midline cervicalgia, cervical paraspinal muscle spasm and underlying cervical degenerative disc disease identified at the C5-6 level without acute/subacute cervical radicular symptoms described. 3.  Stable, nonfocal neurologic exam.  4.  Patient information was provided on dizziness and vestibulopathy. Sincerely,      Artemio Reeves MD    This note was created using speech recognition transcription software. Despite proofreading, there may be several typographical errors present that may affect the meaning of the content. Please call with any questions. Note: A total time of 40 mins.  was spent on the date of service in preparation for this visit, which included face-to-face patient care and completing clinical documentation, and including counseling and coordination of care based on clinical impression, neurologic diagnosis, review of pertinent imaging studies, test results, implementation and discussion of treatment plan, risk factor reduction and patient and/or family education.

## 2021-08-09 ENCOUNTER — OFFICE VISIT (OUTPATIENT)
Dept: PRIMARY CARE CLINIC | Age: 26
End: 2021-08-09
Payer: COMMERCIAL

## 2021-08-09 VITALS
WEIGHT: 222 LBS | HEART RATE: 81 BPM | DIASTOLIC BLOOD PRESSURE: 74 MMHG | RESPIRATION RATE: 16 BRPM | HEIGHT: 74 IN | OXYGEN SATURATION: 98 % | SYSTOLIC BLOOD PRESSURE: 118 MMHG | BODY MASS INDEX: 28.49 KG/M2

## 2021-08-09 DIAGNOSIS — R42 DIZZINESS: Primary | ICD-10-CM

## 2021-08-09 DIAGNOSIS — M50.30 DDD (DEGENERATIVE DISC DISEASE), CERVICAL: ICD-10-CM

## 2021-08-09 PROCEDURE — 99213 OFFICE O/P EST LOW 20 MIN: CPT | Performed by: FAMILY MEDICINE

## 2021-08-09 PROCEDURE — G8417 CALC BMI ABV UP PARAM F/U: HCPCS | Performed by: FAMILY MEDICINE

## 2021-08-09 PROCEDURE — 4004F PT TOBACCO SCREEN RCVD TLK: CPT | Performed by: FAMILY MEDICINE

## 2021-08-09 PROCEDURE — G8427 DOCREV CUR MEDS BY ELIG CLIN: HCPCS | Performed by: FAMILY MEDICINE

## 2021-08-09 ASSESSMENT — ENCOUNTER SYMPTOMS
WHEEZING: 0
BACK PAIN: 0
VOMITING: 0
COLOR CHANGE: 0
NAUSEA: 0
BLOOD IN STOOL: 0
SINUS PRESSURE: 0
ALLERGIC/IMMUNOLOGIC NEGATIVE: 1
ABDOMINAL PAIN: 0
SHORTNESS OF BREATH: 0
PHOTOPHOBIA: 0
DIARRHEA: 0
SORE THROAT: 0
COUGH: 0
CHEST TIGHTNESS: 0
FACIAL SWELLING: 0
APNEA: 0

## 2021-08-09 NOTE — PROGRESS NOTES
Chief Complaint:   Chief Complaint   Patient presents with    Other     follow up after seeing neurologist       Neck Pain   This is a new problem. The current episode started in the past 7 days. The problem occurs daily. The pain is present in the midline. The quality of the pain is described as aching. The pain is at a severity of 4/10. The pain is moderate. Pertinent negatives include no chest pain, headaches, photophobia or weakness. Patient Active Problem List   Diagnosis    Dizziness and giddiness    Cervical (neck) region somatic dysfunction    Cervical paraspinal muscle spasm       Past Medical History:   Diagnosis Date    Cervical (neck) region somatic dysfunction 8/2/2021    Cervical paraspinal muscle spasm 8/2/2021    Dizziness and giddiness 8/2/2021       No past surgical history on file. No current outpatient medications on file. No current facility-administered medications for this visit. Allergies   Allergen Reactions    Pcn [Penicillins] Rash       Social History     Socioeconomic History    Marital status: Single     Spouse name: None    Number of children: None    Years of education: None    Highest education level: None   Occupational History    None   Tobacco Use    Smoking status: Never Smoker    Smokeless tobacco: Current User     Types: Chew    Tobacco comment: rarely   Substance and Sexual Activity    Alcohol use: Yes     Comment: social    Drug use: No    Sexual activity: None   Other Topics Concern    None   Social History Narrative    None     Social Determinants of Health     Financial Resource Strain: Low Risk     Difficulty of Paying Living Expenses: Not hard at all   Food Insecurity: No Food Insecurity    Worried About Running Out of Food in the Last Year: Never true    Nikki of Food in the Last Year: Never true   Transportation Needs:     Lack of Transportation (Medical):      Lack of Transportation (Non-Medical):    Physical Activity:  Days of Exercise per Week:     Minutes of Exercise per Session:    Stress:     Feeling of Stress :    Social Connections:     Frequency of Communication with Friends and Family:     Frequency of Social Gatherings with Friends and Family:     Attends Buddhist Services:     Active Member of Clubs or Organizations:     Attends Club or Organization Meetings:     Marital Status:    Intimate Partner Violence:     Fear of Current or Ex-Partner:     Emotionally Abused:     Physically Abused:     Sexually Abused:        No family history on file. Review of Systems   Constitutional: Negative. HENT: Negative for congestion, facial swelling, hearing loss, nosebleeds, sinus pressure and sore throat. Eyes: Negative for photophobia and visual disturbance. Respiratory: Negative for apnea, cough, chest tightness, shortness of breath and wheezing. Cardiovascular: Negative for chest pain, palpitations and leg swelling. Gastrointestinal: Negative for abdominal pain, blood in stool, diarrhea, nausea and vomiting. Genitourinary: Negative for difficulty urinating, frequency and urgency. Musculoskeletal: Positive for neck pain. Negative for arthralgias, back pain, joint swelling and myalgias. Skin: Negative for color change and rash. Allergic/Immunologic: Negative. Neurological: Positive for dizziness. Negative for syncope, weakness, light-headedness and headaches. Hematological: Negative. Psychiatric/Behavioral: Negative for agitation, behavioral problems, confusion and self-injury. The patient is not nervous/anxious. All other systems reviewed and are negative. Physical Exam  Vitals and nursing note reviewed. Constitutional:       General: He is not in acute distress. Appearance: He is well-developed. HENT:      Head: Normocephalic and atraumatic.       Nose: Nose normal.   Eyes:      Conjunctiva/sclera: Conjunctivae normal.      Pupils: Pupils are equal, round, and

## 2021-08-09 NOTE — LETTER
[address]  [place] [address]  Phone: [phone]  Fax: [facility]        August 9, 2021     Patient: Inge Andrews   YOB: 1995   Date of Visit: 8/9/2021       To Whom It May Concern: It is my medical opinion that Rica Madsen may return to full duty immediately with no restrictions. If you have any questions or concerns, please don't hesitate to call.     Sincerely,        Heide Torres, DO

## 2021-08-11 ENCOUNTER — OFFICE VISIT (OUTPATIENT)
Dept: CHIROPRACTIC MEDICINE | Age: 26
End: 2021-08-11
Payer: COMMERCIAL

## 2021-08-11 VITALS — HEART RATE: 92 BPM | RESPIRATION RATE: 16 BRPM | OXYGEN SATURATION: 98 % | TEMPERATURE: 98.8 F

## 2021-08-11 DIAGNOSIS — M62.830 MUSCLE SPASM OF BACK: ICD-10-CM

## 2021-08-11 DIAGNOSIS — M50.30 DEGENERATIVE DISC DISEASE, CERVICAL: Primary | ICD-10-CM

## 2021-08-11 DIAGNOSIS — M54.04 PANNICULITIS AFFECTING REGIONS OF NECK AND BACK, THORACIC REGION: ICD-10-CM

## 2021-08-11 PROCEDURE — 98940 CHIROPRACT MANJ 1-2 REGIONS: CPT | Performed by: CHIROPRACTOR

## 2021-08-11 PROCEDURE — G0283 ELEC STIM OTHER THAN WOUND: HCPCS | Performed by: CHIROPRACTOR

## 2021-08-11 PROCEDURE — 99203 OFFICE O/P NEW LOW 30 MIN: CPT | Performed by: CHIROPRACTOR

## 2021-08-11 PROCEDURE — G8417 CALC BMI ABV UP PARAM F/U: HCPCS | Performed by: CHIROPRACTOR

## 2021-08-11 PROCEDURE — 4004F PT TOBACCO SCREEN RCVD TLK: CPT | Performed by: CHIROPRACTOR

## 2021-08-11 PROCEDURE — G8427 DOCREV CUR MEDS BY ELIG CLIN: HCPCS | Performed by: CHIROPRACTOR

## 2021-08-11 NOTE — PROGRESS NOTES
MHYX N LIMA CHIRO    21  Mary Grace Alert : 1995 Sex: male  Age: 23 y.o. Patient was referred by Forrest Carrillo DO    Chief Complaint   Patient presents with    Neck Pain     Neck pain [id number] no known injury. Stiffness & burning sensation       Drives truck - local   Neck pain for 2 months  No injury  Pain   occ tingle left digits 3&4  Saw neurology for some transient dizziness/vertigo. Has had a CT on his neck. Neck Pain   This is a new problem. The current episode started more than 1 month ago. The problem occurs intermittently. The problem has been unchanged. The pain is associated with nothing. The pain is present in the midline. The quality of the pain is described as burning (some throbbing). Exacerbated by: flexion and extension - popping. Associated symptoms include headaches. Pertinent negatives include no fever, numbness or weakness. He has tried NSAIDs, acetaminophen, muscle relaxants and heat for the symptoms. The treatment provided no relief. Red Flags:  none    Review of Systems   Constitutional: Negative for chills and fever. Musculoskeletal: Positive for neck pain. Neurological: Positive for dizziness and headaches. Negative for weakness and numbness. One episode of vertigo with this          No current outpatient medications on file. Allergies   Allergen Reactions    Pcn [Penicillins] Rash       Past Medical History:   Diagnosis Date    Cervical (neck) region somatic dysfunction 2021    Cervical paraspinal muscle spasm 2021    Dizziness and giddiness 2021     No family history on file. No past surgical history on file.   Social History     Socioeconomic History    Marital status: Single     Spouse name: Not on file    Number of children: Not on file    Years of education: Not on file    Highest education level: Not on file   Occupational History    Not on file   Tobacco Use    Smoking status: Never Smoker    Smokeless tobacco: Current User     Types: Rhoderick Arena Tobacco comment: rarely   Substance and Sexual Activity    Alcohol use: Yes     Comment: social    Drug use: No    Sexual activity: Not on file   Other Topics Concern    Not on file   Social History Narrative    Not on file     Social Determinants of Health     Financial Resource Strain: Low Risk     Difficulty of Paying Living Expenses: Not hard at all   Food Insecurity: No Food Insecurity    Worried About Running Out of Food in the Last Year: Never true    [address] in the Last Year: Never true   Transportation Needs:     Lack of Transportation (Medical):  Lack of Transportation (Non-Medical):    Physical Activity:     Days of Exercise per Week:     Minutes of Exercise per Session:    Stress:     Feeling of Stress :    Social Connections:     Frequency of Communication with Friends and Family:     Frequency of Social Gatherings with Friends and Family:     Attends Taoism Services:     Active Member of Clubs or Organizations:     Attends Club or Organization Meetings:     Marital Status:    Intimate Partner Violence:     Fear of Current or Ex-Partner:     Emotionally Abused:     Physically Abused:     Sexually Abused:        Vitals:    08/11/21 1340   Pulse: 92   Resp: 16   Temp: 98.8 °F (37.1 °C)   TempSrc: Temporal   SpO2: 98%       EXAM:  Appearance: alert, well appearing, and in no distress and oriented to person, place, and time. Cervical AROM:  Flexion: 40 /50  Extension: 50 /60  Right lateral flexion: 40/45  Left lateral flexion: 40/45  Right Rotation: 70/80  Left Rotation:  70/80    Anterior head carriage and forward rounded shoulders is noted. Cervical Compression Test: negative  Cervical Distraction Test:positive  Foraminal Compression Test:negative  Maximum Cervical Compression Test:negative  Shoulder Depression Test: negative  Neurovascular Compression testing at the clavicular fossa: negative    Neck is supple.   No midline pain with palpation. Taut and Tender fibers noted in the cervical and thoracic paraspinals. Trigger points in the bilateral trapezius. Joint fixation with motion screening at: C5-7, T4-5    Melinda Haynes was seen today for neck pain. Diagnoses and all orders for this visit:    Degenerative disc disease, cervical    Panniculitis affecting regions of neck and back, thoracic region    Muscle spasm of back        Treatment Plan:   I spoke with him regarding my exam findings and treatment options. Reviewed the CT report and the notes from neurology as well as Dr. Mireille Rosas. I recommended that we start a trial of conservative care today consisting manipulation, EMS and home-based self-care to include postural alterations. I will plan on seeing him 1 times per week for 4 weeks, then reassess to determine response to care and future options. With consent, I did begin today. He has had prior chiropractic care when he was in high schoolDr. Gus Cowing    Problem/Goals  Decrease pain and/or swelling    Today's Treatment  EMS with heat to the cervicothoracic region for 15 minutes to address muscle spasm/hypertension and alleviate pain. Diversified manipulation to the listed cervical, thoracic segments. Tolerated well  I spoke to him regarding posttreatment soreness. Should any arise, he  may use ice for 10-15 minutes, over-the-counter NSAIDs or topical gels. Seen By:  Pradeep Holcomb DC    * This note was created using voice recognition software.   The note was reviewed, however grammatical errors may exist.

## 2021-08-23 ENCOUNTER — OFFICE VISIT (OUTPATIENT)
Dept: CHIROPRACTIC MEDICINE | Age: 26
End: 2021-08-23
Payer: COMMERCIAL

## 2021-08-23 VITALS — RESPIRATION RATE: 16 BRPM | OXYGEN SATURATION: 98 % | TEMPERATURE: 98.8 F | HEART RATE: 79 BPM

## 2021-08-23 DIAGNOSIS — M54.04 PANNICULITIS AFFECTING REGIONS OF NECK AND BACK, THORACIC REGION: ICD-10-CM

## 2021-08-23 DIAGNOSIS — M50.30 DEGENERATIVE DISC DISEASE, CERVICAL: Primary | ICD-10-CM

## 2021-08-23 DIAGNOSIS — M62.830 MUSCLE SPASM OF BACK: ICD-10-CM

## 2021-08-23 PROCEDURE — 98940 CHIROPRACT MANJ 1-2 REGIONS: CPT | Performed by: CHIROPRACTOR

## 2021-08-23 PROCEDURE — G0283 ELEC STIM OTHER THAN WOUND: HCPCS | Performed by: CHIROPRACTOR

## 2021-08-23 NOTE — PROGRESS NOTES
21  Eric Hobson : 1995 Sex: male  Age: 23 y.o. Chief Complaint   Patient presents with    Neck Pain       HPI:   There is getting some short lasting relief following his initial visit. He has no new issues today. Continues with neck and upper back tightness, aching and burning. No current outpatient medications on file. Exam:   Vitals:    21 1606   Pulse: 79   Resp: 16   Temp: 98.8 °F (37.1 °C)   SpO2: 98%         There are hypertonic and tender fibers noted today in the cervical and thoracic paraspinal muscles. There are active trigger points in the bilateral trapezius today. Continued postural alterations as noted previously. No midline pain. Joint fixation is noted with motion screening at C4-5, T3-5. Katya Connolly was seen today for neck pain. Diagnoses and all orders for this visit:    Degenerative disc disease, cervical    Panniculitis affecting regions of neck and back, thoracic region    Muscle spasm of back        Treatment Plan:  EMS with heat to the cervicothoracic region for 15 minutes to address muscle spasm/hypertension and alleviate pain. Diversified manipulation to the listed cervical, thoracic segments. Tolerated well. Continue with home-based self-care as previously outlined. I will see him back in 1 week for further treatment.         Seen By:  Emilee Magana DC

## 2021-12-18 ENCOUNTER — APPOINTMENT (OUTPATIENT)
Dept: GENERAL RADIOLOGY | Age: 26
End: 2021-12-18
Payer: COMMERCIAL

## 2021-12-18 ENCOUNTER — HOSPITAL ENCOUNTER (EMERGENCY)
Age: 26
Discharge: HOME | End: 2021-12-19
Attending: EMERGENCY MEDICINE
Payer: COMMERCIAL

## 2021-12-18 VITALS
OXYGEN SATURATION: 98 % | BODY MASS INDEX: 30.17 KG/M2 | HEART RATE: 84 BPM | RESPIRATION RATE: 16 BRPM | DIASTOLIC BLOOD PRESSURE: 74 MMHG | WEIGHT: 235 LBS | SYSTOLIC BLOOD PRESSURE: 140 MMHG | TEMPERATURE: 97.8 F

## 2021-12-18 DIAGNOSIS — S62.339A CLOSED BOXER'S FRACTURE, INITIAL ENCOUNTER: Primary | ICD-10-CM

## 2021-12-18 PROCEDURE — 99283 EMERGENCY DEPT VISIT LOW MDM: CPT

## 2021-12-18 PROCEDURE — 96372 THER/PROPH/DIAG INJ SC/IM: CPT

## 2021-12-18 PROCEDURE — 73130 X-RAY EXAM OF HAND: CPT

## 2021-12-18 PROCEDURE — 6360000002 HC RX W HCPCS: Performed by: EMERGENCY MEDICINE

## 2021-12-18 PROCEDURE — 29125 APPL SHORT ARM SPLINT STATIC: CPT

## 2021-12-18 PROCEDURE — 73110 X-RAY EXAM OF WRIST: CPT

## 2021-12-18 RX ORDER — KETOROLAC TROMETHAMINE 30 MG/ML
30 INJECTION, SOLUTION INTRAMUSCULAR; INTRAVENOUS ONCE
Status: COMPLETED | OUTPATIENT
Start: 2021-12-18 | End: 2021-12-18

## 2021-12-18 RX ADMIN — KETOROLAC TROMETHAMINE 30 MG: 30 INJECTION, SOLUTION INTRAMUSCULAR at 22:16

## 2021-12-18 ASSESSMENT — PAIN DESCRIPTION - DESCRIPTORS: DESCRIPTORS: THROBBING;BURNING

## 2021-12-18 ASSESSMENT — PAIN DESCRIPTION - PAIN TYPE: TYPE: ACUTE PAIN

## 2021-12-18 ASSESSMENT — PAIN DESCRIPTION - ORIENTATION: ORIENTATION: RIGHT

## 2021-12-18 ASSESSMENT — PAIN DESCRIPTION - LOCATION: LOCATION: HAND

## 2021-12-18 ASSESSMENT — PAIN SCALES - GENERAL: PAINLEVEL_OUTOF10: 7

## 2021-12-18 NOTE — Clinical Note
Spenser Gordon was seen and treated in our emergency department on 12/18/2021. He may return to work on 12/27/2021. If you have any questions or concerns, please don't hesitate to call.       Osorio Guthrie, DO

## 2021-12-19 NOTE — ED PROVIDER NOTES
HPI:  12/18/21,   Time: 9:48 PM ELAINA Hernandez is a 23 y.o. male presenting to the ED for right hand pain, beginning 1 day ago. The complaint has been persistent, moderate in severity, and worsened by nothing. Patient states that he got upset and hit some stairs 1 day prior to arrival.  He is having pain in the right metacarpal.  He denies any numbness, tingling. Pain is worse with flexion and extension of his fifth right finger. Denies any previous trauma or injury to his hand. Denies any wrist pain or arm pain. ROS:   Pertinent positives and negatives are stated within HPI, all other systems reviewed and are negative.  --------------------------------------------- PAST HISTORY ---------------------------------------------  Past Medical History:  has a past medical history of Cervical (neck) region somatic dysfunction, Cervical paraspinal muscle spasm, and Dizziness and giddiness. Past Surgical History:  has no past surgical history on file. Social History:  reports that he has never smoked. His smokeless tobacco use includes chew. He reports current alcohol use. He reports that he does not use drugs. Family History: family history is not on file. The patients home medications have been reviewed. Allergies: Pcn [penicillins]    -------------------------------------------------- RESULTS -------------------------------------------------  All laboratory and radiology results have been personally reviewed by myself   LABS:  No results found for this visit on 12/18/21. RADIOLOGY:  Interpreted by Radiologist.  XR HAND RIGHT (MIN 3 VIEWS)   Final Result   Boxer's fracture of the right 5th metacarpal         XR WRIST RIGHT (MIN 3 VIEWS)   Final Result   Negative right wrist radiographs             ------------------------- NURSING NOTES AND VITALS REVIEWED ---------------------------   The nursing notes within the ED encounter and vital signs as below have been reviewed.    BP (!) 140/74   Pulse 84   Temp 97.8 °F (36.6 °C) (Oral)   Resp 16   Wt 235 lb (106.6 kg)   SpO2 98%   BMI 30.17 kg/m²   Oxygen Saturation Interpretation: Normal      ---------------------------------------------------PHYSICAL EXAM--------------------------------------      Constitutional/General: Alert and oriented x3, well appearing, non toxic in NAD  Head: NC/AT  Eyes: PERRL, EOMI  Mouth: Oropharynx clear, handling secretions, no trismus  Neck: Supple, full ROM, no meningeal signs  Pulmonary: Lungs clear to auscultation bilaterally, no wheezes, rales, or rhonchi. Not in respiratory distress  Cardiovascular:  Regular rate and rhythm, no murmurs, gallops, or rubs. 2+ distal pulses  Abdomen: Soft, non tender  Extremities: Moves all extremities x 4. Warm and well perfused. Right fifth metacarpal tenderness to palpation and swelling. No snuffbox tenderness to palpation. No wrist tenderness to palpation. No forearm tenderness to palpation. Skin: warm and dry without rash  Neurologic: GCS 15, CN 2-12 intact. Psych: Normal Affect      ------------------------------ ED COURSE/MEDICAL DECISION MAKING----------------------  Medications   ketorolac (TORADOL) injection 30 mg (30 mg IntraMUSCular Given 12/18/21 2216)         Medical Decision Making:    Boxers fracture to the right 5th metacarpal appreciated. XR of the right wrist negative. Ulnar gutter splint applied. Patient is neurovascular intact. Will have follow up with orthopedic surgery this week. I told him to take over-the-counter Tylenol Motrin as needed for pain, to follow-up with his orthopedic surgeon this week, and to return for any worsening symptoms. Patient is agreeable with plan of care. Counseling: The emergency provider has spoken with the patient and discussed todays results, in addition to providing specific details for the plan of care and counseling regarding the diagnosis and prognosis.   Questions are answered at this time and they are agreeable with the plan.      --------------------------------- IMPRESSION AND DISPOSITION ---------------------------------    IMPRESSION  1.  Closed boxer's fracture, initial encounter        DISPOSITION  Disposition: Discharge to home  Patient condition is stable                  Raoul Dee MD  12/18/21 4214

## 2021-12-23 ENCOUNTER — APPOINTMENT (OUTPATIENT)
Dept: GENERAL RADIOLOGY | Age: 26
End: 2021-12-23
Payer: COMMERCIAL

## 2021-12-23 ENCOUNTER — HOSPITAL ENCOUNTER (EMERGENCY)
Age: 26
Discharge: HOME | End: 2021-12-23
Attending: FAMILY MEDICINE
Payer: COMMERCIAL

## 2021-12-23 VITALS
BODY MASS INDEX: 29.22 KG/M2 | DIASTOLIC BLOOD PRESSURE: 83 MMHG | HEART RATE: 121 BPM | HEIGHT: 76 IN | OXYGEN SATURATION: 95 % | RESPIRATION RATE: 16 BRPM | WEIGHT: 240 LBS | TEMPERATURE: 98.8 F | SYSTOLIC BLOOD PRESSURE: 133 MMHG

## 2021-12-23 DIAGNOSIS — U07.1 COVID: Primary | ICD-10-CM

## 2021-12-23 LAB
BACTERIA: NORMAL /HPF
BILIRUBIN URINE: NEGATIVE
BLOOD, URINE: NEGATIVE
CLARITY: CLEAR
COLOR: YELLOW
EPITHELIAL CELLS, UA: NORMAL /HPF
GLUCOSE URINE: NEGATIVE MG/DL
KETONES, URINE: NEGATIVE MG/DL
LEUKOCYTE ESTERASE, URINE: NEGATIVE
NITRITE, URINE: NEGATIVE
PH UA: 6 (ref 5–9)
PROTEIN UA: NEGATIVE MG/DL
RBC UA: NORMAL /HPF (ref 0–2)
SARS-COV-2, NAAT: DETECTED
SPECIFIC GRAVITY UA: 1.02 (ref 1–1.03)
UROBILINOGEN, URINE: 0.2 E.U./DL
WBC UA: NORMAL /HPF (ref 0–5)

## 2021-12-23 PROCEDURE — 87635 SARS-COV-2 COVID-19 AMP PRB: CPT

## 2021-12-23 PROCEDURE — 99284 EMERGENCY DEPT VISIT MOD MDM: CPT

## 2021-12-23 PROCEDURE — 81001 URINALYSIS AUTO W/SCOPE: CPT

## 2021-12-23 PROCEDURE — 71045 X-RAY EXAM CHEST 1 VIEW: CPT

## 2021-12-23 RX ORDER — DEXAMETHASONE 6 MG/1
6 TABLET ORAL
Qty: 10 TABLET | Refills: 0 | Status: SHIPPED | OUTPATIENT
Start: 2021-12-23 | End: 2022-01-02

## 2021-12-23 RX ORDER — MULTIVIT-MIN/IRON/FOLIC ACID/K 18-600-40
500 CAPSULE ORAL DAILY
Qty: 30 CAPSULE | Refills: 0 | Status: SHIPPED | OUTPATIENT
Start: 2021-12-23 | End: 2022-06-29 | Stop reason: ALTCHOICE

## 2021-12-23 RX ORDER — MELATONIN
1000 DAILY
Qty: 30 TABLET | Refills: 0 | Status: SHIPPED | OUTPATIENT
Start: 2021-12-23 | End: 2022-06-29 | Stop reason: ALTCHOICE

## 2021-12-23 ASSESSMENT — PAIN SCALES - GENERAL: PAINLEVEL_OUTOF10: 5

## 2021-12-23 NOTE — DISCHARGE INSTR - COC
Continuity of Care Form    Patient Name: Elverna Pallas   :  1995  MRN:  [id number]    Admit date:  2021  Discharge date:  ***    Code Status Order: No Order   Advance Directives:      Admitting Physician:  No admitting provider for patient encounter. PCP: Maged Jacobs DO    Discharging Nurse: [facility] Unit/Room#:   Discharging Unit Phone Number: ***    Emergency Contact:   Extended Emergency Contact Information  Primary Emergency Contact: Keon Rich  Address: [address] [place], [address] [place] [address] Phone: [phone]  Work Phone: [phone]  Mobile Phone: [phone]  Relation: Parent  Secondary Emergency Contact: Ivania Avalos  Address: [facility] [address] Phone: [phone]  Mobile Phone: [phone]  Relation: Parent    Past Surgical History:  History reviewed. No pertinent surgical history.     Immunization History:   Immunization History   Administered Date(s) Administered    Tdap (Boostrix, Adacel) 2016       Active Problems:  Patient Active Problem List   Diagnosis Code    Dizziness and giddiness R42    Cervical (neck) region somatic dysfunction M99.01    Cervical paraspinal muscle spasm M62.838       Isolation/Infection:   Isolation            No Isolation          Patient Infection Status       Infection Onset Added Last Indicated Last Indicated By Review Planned Expiration Resolved Resolved By    COVID-19 21 COVID-19, Rapid 21      Resolved    COVID-19 (Rule Out) 21 COVID-19, Rapid (Ordered)   21 Rule-Out Test Resulted    COVID-19 (Rule Out) 21 COVID-19, Rapid (Ordered)   21 Rule-Out Test Resulted    INFLUENZA 20 Rapid influenza A/B antigens   20             Nurse Assessment:  Last Vital Signs: /83 Pulse 121   Temp 98.8 °F (37.1 °C) (Oral)   Resp 16   Ht 6' 4\" (1.93 m)   Wt 240 lb (108.9 kg)   SpO2 95%   BMI 29.21 kg/m²     Last documented pain score (0-10 scale): Pain Level: 5  Last Weight:   Wt Readings from Last 1 Encounters:   12/23/21 240 lb (108.9 kg)     Mental Status:  {IP PT MENTAL STATUS:56692}    IV Access:  { PIERRE IV ACCESS:074995184}    Nursing Mobility/ADLs:  Walking   {CHP DME BUCV:898749873}  Transfer  {CHP DME AOZL:306552268}  Bathing  {CHP DME GJBZ:685043021}  Dressing  {CHP DME DUVB:548167799}  Toileting  {CHP DME HSSZ:930812356}  Feeding  {CHP DME QNOW:252719307}  Med Admin  {CHP DME XZWC:790414812}  Med Delivery   { PIERRE MED Delivery:562316816}    Wound Care Documentation and Therapy:  Wound 06/24/16 Abrasion(s) Leg large abrasion just below knee left leg, no drainage, slight erythema surrounding wound, (Active)   Number of days: 2007        Elimination:  Continence: Bowel: {YES / ST:56925}  Bladder: {YES / WB:93701}  Urinary Catheter: {Urinary Catheter:543872584}   Colostomy/Ileostomy/Ileal Conduit: {YES / QJ:94433}       Date of Last BM: ***  No intake or output data in the 24 hours ending 12/23/21 0958  No intake/output data recorded.     Safety Concerns:     [address] Safety Concerns:954785813}    Impairments/Disabilities:      [address] Impairments/Disabilities:273780450}    Nutrition Therapy:  Current Nutrition Therapy:   [address] Diet List:198502872}    Routes of Feeding: {CHP DME Other Feedings:373557672}  Liquids: {Slp liquid thickness:35185}  Daily Fluid Restriction: {CHP DME Yes amt example:359903355}  Last Modified Barium Swallow with Video (Video Swallowing Test): {Done Not Done TKRJ:820979844}    Treatments at the Time of Hospital Discharge:   Respiratory Treatments: ***  Oxygen Therapy:  {Therapy; copd oxygen:47541}  Ventilator:    {LAYLA BABCOCK Vent EIUL:150392939}    Rehab Therapies: {THERAPEUTIC INTERVENTION:3699574199}  Weight Bearing Status/Restrictions: {LAYLA BABCOCK Weight Bearin}  Other Medical Equipment (for information only, NOT a DME order):  {EQUIPMENT:521183097}  Other Treatments: ***    Patient's personal belongings (please select all that are sent with patient):  {CHP DME Belongings:954423317}    RN SIGNATURE:  {Esignature:881734668}    CASE MANAGEMENT/SOCIAL WORK SECTION    Inpatient Status Date: ***    Readmission Risk Assessment Score:  Readmission Risk              Risk of Unplanned Readmission:  0           Discharging to Facility/ Agency   Name:   Address:  Phone:  Fax:    Dialysis Facility (if applicable)   Name:  Address:  Dialysis Schedule:  Phone:  Fax:    / signature: {Esignature:900023366}    PHYSICIAN SECTION    Prognosis: {Prognosis:6209516274}    Condition at Discharge: [address] [place] [address] Patient Condition:650583588}    Rehab Potential (if transferring to Rehab): {Prognosis:6408051361}    Recommended Labs or Other Treatments After Discharge: ***    Physician Certification: I certify the above information and transfer of Ayush Cordova  is necessary for the continuing treatment of the diagnosis listed and that he requires {Admit to Appropriate Level of Care:08558} for {GREATER/LESS:340586454} 30 days.      Update Admission H&P: {CHP DME Changes in IEK:369819502}    PHYSICIAN SIGNATURE:  {Esignature:654783834}

## 2021-12-23 NOTE — ED PROVIDER NOTES
HPI:  12/23/21,   Time: 8:08 AM ELAINA Conrad is a 23 y.o. male presenting to the ED for cough congestion, body aches fatigue beginning 2 days ago. The complaint has been persistent, moderate in severity, and worsened by nothing. Denies any sore throat, neck stiffness fever chills nausea or vomiting no chest or abdominal pain no shortness of breath    ROS:   Pertinent positives and negatives are stated within HPI, all other systems reviewed and are negative.  --------------------------------------------- PAST HISTORY ---------------------------------------------  Past Medical History:  has a past medical history of Cervical (neck) region somatic dysfunction, Cervical paraspinal muscle spasm, and Dizziness and giddiness. Past Surgical History:  has no past surgical history on file. Social History:  reports that he has never smoked. His smokeless tobacco use includes chew. He reports current alcohol use. He reports that he does not use drugs. Family History: family history is not on file. The patients home medications have been reviewed.     Allergies: Pcn [penicillins]    -------------------------------------------------- RESULTS -------------------------------------------------  All laboratory and radiology results have been personally reviewed by myself   LABS:  Results for orders placed or performed during the hospital encounter of 12/23/21   COVID-19, Rapid    Specimen: Nasopharyngeal Swab   Result Value Ref Range    SARS-CoV-2, NAAT DETECTED (A) Not Detected   Urinalysis with Microscopic   Result Value Ref Range    Color, UA Yellow Straw/Yellow    Clarity, UA Clear Clear    Glucose, Ur Negative Negative mg/dL    Bilirubin Urine Negative Negative    Ketones, Urine Negative Negative mg/dL    Specific Gravity, UA 1.025 1.005 - 1.030    Blood, Urine Negative Negative    pH, UA 6.0 5.0 - 9.0    Protein, UA Negative Negative mg/dL    Urobilinogen, Urine 0.2 <2.0 E.U./dL    Nitrite, Urine Negative Negative    Leukocyte Esterase, Urine Negative Negative    WBC, UA NONE 0 - 5 /HPF    RBC, UA 0-1 0 - 2 /HPF    Epithelial Cells, UA FEW /HPF    Bacteria, UA NONE SEEN None Seen /HPF       RADIOLOGY:  Interpreted by Radiologist.  XR CHEST PORTABLE   Final Result   1. Vague patchy infiltrate within the right lower lobe suggestive of   pneumonia.             ------------------------- NURSING NOTES AND VITALS REVIEWED ---------------------------   The nursing notes within the ED encounter and vital signs as below have been reviewed. /83   Pulse 121   Temp 98.8 °F (37.1 °C) (Oral)   Resp 16   Ht 6' 4\" (1.93 m)   Wt 240 lb (108.9 kg)   SpO2 95%   BMI 29.21 kg/m²   Oxygen Saturation Interpretation: Normal      ---------------------------------------------------PHYSICAL EXAM--------------------------------------      Constitutional/General: Alert and oriented x3, well appearing, non toxic in NAD  Head: NC/AT  Eyes: PERRL, EOMI  Mouth: Erythematous otherwise oropharynx clear, handling secretions, no trismus  Neck: Supple, full ROM, no meningeal signs  Pulmonary: Lungs clear to auscultation bilaterally, no wheezes, rales, or rhonchi. Not in respiratory distress  Cardiovascular:  Regular rate and rhythm, no murmurs, gallops, or rubs. 2+ distal pulses  Abdomen: Soft, non tender, non distended,   Extremities: Moves all extremities x 4. Warm and well perfused  Skin: warm and dry without rash  Neurologic: GCS 15,  Psych: Normal Affect      ------------------------------ ED COURSE/MEDICAL DECISION MAKING----------------------  Medications - No data to display      Medical Decision Making:    Patient not tachypneic tachycardic or hypoxic he is nontoxic will discharge home with advised instructions as well as prescriptions. Counseling:    The emergency provider has spoken with the patient and discussed todays results, in addition to providing specific details for the plan of care and counseling regarding the diagnosis and prognosis.   Questions are answered at this time and they are agreeable with the plan.      --------------------------------- IMPRESSION AND DISPOSITION ---------------------------------    IMPRESSION  1. COVID        DISPOSITION  Disposition: Discharge to home  Patient condition is good                  Sal Cruz MD  12/23/21 2138

## 2021-12-27 ENCOUNTER — CARE COORDINATION (OUTPATIENT)
Dept: CARE COORDINATION | Age: 26
End: 2021-12-27

## 2021-12-27 NOTE — CARE COORDINATION
Date/Time:  12/27/2021 12:18 PM  Attempted to reach patient by telephone. Call within 2 business days of discharge: Yes Left HIPPA compliant message requesting a return call. Will attempt to reach patient again.

## 2021-12-28 ENCOUNTER — CARE COORDINATION (OUTPATIENT)
Dept: CARE COORDINATION | Age: 26
End: 2021-12-28

## 2022-04-25 RX ORDER — VALACYCLOVIR HYDROCHLORIDE 1 G/1
1000 TABLET, FILM COATED ORAL 2 TIMES DAILY
Qty: 20 TABLET | Refills: 1 | Status: SHIPPED
Start: 2022-04-25 | End: 2022-08-09

## 2022-04-25 NOTE — TELEPHONE ENCOUNTER
----- Message from Topher Muse sent at 4/23/2022 10:38 AM EDT -----  Subject: Refill Request    QUESTIONS  Name of Medication? Other - Valtrex  Patient-reported dosage and instructions? 1 gram  How many days do you have left? 0  [facility]erred [facility]? [id number]  Pharmacy phone number (if available)? [phone]  ---------------------------------------------------------------------------  --------------  CALL BACK INFO  What is the best way for the office to contact you? OK to leave message on   voicemail  Preferred Call Back Phone Number? [phone]  ---------------------------------------------------------------------------  --------------  SCRIPT ANSWERS  Relationship to Patient?  Self

## 2022-06-22 ENCOUNTER — HOSPITAL ENCOUNTER (EMERGENCY)
Age: 27
Discharge: HOME | End: 2022-06-22
Payer: COMMERCIAL

## 2022-06-22 ENCOUNTER — APPOINTMENT (OUTPATIENT)
Dept: GENERAL RADIOLOGY | Age: 27
End: 2022-06-22
Payer: COMMERCIAL

## 2022-06-22 VITALS
SYSTOLIC BLOOD PRESSURE: 133 MMHG | DIASTOLIC BLOOD PRESSURE: 84 MMHG | HEART RATE: 96 BPM | RESPIRATION RATE: 16 BRPM | WEIGHT: 250 LBS | BODY MASS INDEX: 30.43 KG/M2 | OXYGEN SATURATION: 98 % | TEMPERATURE: 98.6 F

## 2022-06-22 DIAGNOSIS — S63.639A TRAUMATIC RUPTURE OF TENDON OF DISTAL INTERPHALANGEAL (DIP) JOINT OF FINGER, INITIAL ENCOUNTER: Primary | ICD-10-CM

## 2022-06-22 PROCEDURE — 73130 X-RAY EXAM OF HAND: CPT

## 2022-06-22 PROCEDURE — 99283 EMERGENCY DEPT VISIT LOW MDM: CPT

## 2022-06-22 RX ORDER — IBUPROFEN 600 MG/1
600 TABLET ORAL EVERY 6 HOURS PRN
Qty: 20 TABLET | Refills: 0 | Status: SHIPPED | OUTPATIENT
Start: 2022-06-22 | End: 2022-06-29 | Stop reason: ALTCHOICE

## 2022-06-22 NOTE — ED PROVIDER NOTES
[address]  Department of Emergency Medicine   ED  Encounter Note  Admit Date/RoomTime: 2022  7:44 PM  ED Room: [facility]  NAME: Carlos Machado  : 1995  MRN: [id number]     Chief Complaint:  Finger Injury (was stuck in dog leash, pain in left ring finger )    HISTORY OF PRESENT ILLNESS        Carlos Machado is a 33 y.o. male who presents to the ED with a complaint of injury to his left ring finger. Patient states just prior to arrival his 60 pound dog went to run out of the house. Patient went to quickly grabbed the dog's harness and states his left ring finger got stuck in the leash and was yanked. He states it did not look deformed, but he thought that it was dislocated. He states now he cannot bend the tip of his left ring finger. Patient denies any other injuries. He is right-handed. Symptoms are mild in severity. He did not take any medication for this complaint. ROS   Pertinent positives and negatives are stated within HPI, all other systems reviewed and are negative. Past Medical History:  has a past medical history of Cervical (neck) region somatic dysfunction, Cervical paraspinal muscle spasm, and Dizziness and giddiness. Surgical History:  has no past surgical history on file. Social History:  reports that he has never smoked. His smokeless tobacco use includes chew. He reports current alcohol use. He reports that he does not use drugs. Family History: family history is not on file. Allergies: Pcn [penicillins]    PHYSICAL EXAM   Oxygen Saturation Interpretation: Normal on room air analysis. ED Triage Vitals   BP Temp Temp Source Heart Rate Resp SpO2 Height Weight   22 -- 22   133/84 98.6 °F (37 °C) Temporal 96 16 98 %  250 lb (113.4 kg)       General:  NAD. Alert and Oriented. Well-appearing. Skin:  Warm, dry.   No rashes. Head:  Normocephalic. Atraumatic. Eyes:  EOMI. Conjunctiva normal.  ENT:  Oral mucosa moist.  Airway patent. Neck:  Supple. Normal ROM. Respiratory:  No respiratory distress. No labored breathing. Lungs clear without rales, rhonchi or wheezing. Cardiovascular:  Regular rate. No Murmur. No peripheral edema. Extremities warm and good color. Extremities:    Left ring finger with tenderness to the DIP and distal phalanx. Patient cannot flex the distal phalanx of the left ring finger. Nail is intact. No subungual hematoma. Some tenderness to palpation within the left palm, without bruising or swelling. 2+ left radial pulse. Back:  Normal ROM. Nontender to palpation. Neuro:  Alert and Oriented to person, place, time and situation. Normal LOC. Moves all extremities. Speech fluent. Psych:  Calm and Cooperative. Normal thought process. Normal judgement. Lab / Imaging Results   (All laboratory and radiology results have been personally reviewed by myself)  Labs:  No results found for this visit on 06/22/22. Imaging: All Radiology results interpreted by Radiologist unless otherwise noted. XR HAND LEFT (MIN 3 VIEWS)    (Results Pending)     Negative fracture noted to left hand, left fingers. ED Course / Medical Decision Making   Medications - No data to display     Re-examination:  6/22/22       Time:   Patients condition . Consult(s):   None    Procedure(s):   Post splint examination:  Splint has been applied by ED tech and/or Rn. Post-splint check and neuro exam performed by myself. Splint is appropriately applied. Neurovascular intact with good pulse, normal color and warm extremity. Instructions on what to watch for vascular compromise explained to patient and/or family at bedside. Explained to patient and/or family this is a temporary splint and they will need to be reevaluated by Ortho specialty or their PCP.   Patient and her family understand they can return to the ED at anytime for any concerns regarding extremity problem and/or splint. MDM:   Discussed with patient that he probably popped the distal tendon to the left ring finger. Explained to him that the splint with prevention of bending that finger aids in the healing. Patient will also be referred to orthopedics to follow-up in case this does not heal appropriately. Plan of Care/Counseling:  Physician Assistant on duty reviewed today's visit with the patient in addition to providing specific details for the plan of care and counseling regarding the diagnosis and prognosis. Questions are answered at this time and are agreeable with the plan. ASSESSMENT     1. Traumatic rupture of tendon of distal interphalangeal (DIP) joint of finger, initial encounter      PLAN   Discharged home. Patient condition is good    New Medications     New Prescriptions    IBUPROFEN (IBU) 600 MG TABLET    Take 1 tablet by mouth every 6 hours as needed for Pain     Electronically signed by SKYE Garza   DD: 6/22/22  **This report was transcribed using voice recognition software. Every effort was made to ensure accuracy; however, inadvertent computerized transcription errors may be present.   END OF ED PROVIDER NOTE       Don Garza  06/22/22 0848

## 2022-06-22 NOTE — Clinical Note
Lori Rios was seen and treated in our emergency department on 6/22/2022. He may return to work on 06/24/2022. If you have any questions or concerns, please don't hesitate to call.       Roxanna Joseph, [address]

## 2022-07-05 ENCOUNTER — ANESTHESIA EVENT (OUTPATIENT)
Dept: OPERATING ROOM | Age: 27
End: 2022-07-05
Payer: COMMERCIAL

## 2022-07-05 NOTE — H&P
History and Physical    Patient's Name/Date of Birth: Beltran Suarez / 1995 (85 y.o.)    Date: July 5, 2022     Chief Complaint: Left ring finger pain, related to an accident. HPI:     This is a 33years of age, right-hand-dominant, white male, who had an accident on 06/27/2022. The patient was at home, where he was letting the dogs out, a total of 5. He was fixing a chair, and the little dog, female, age 12 and a 72 pound pitbull ended up causing a hyperextension injury of his left ring finger. He did have immediate severe pain of the left ring finger DIP (distal interphalangeal) joint. I did provide the initial evaluation on Raman Tay, at our [facility] office, on an urgent basis, after surgery on 06/27/2022. Patient was noted to have a complete inability to flex at the DIP joint. His physical exam was highly consistent with a complete rupture of the FDP tendon off of the base of the P3 bone. I have counseled the patient that he needs urgent surgery, no form of flexor tendon repair, possible tenodesis, possible reconstruction. I do not think a two-stage reconstruction is necessary, if we get to his surgery within 3 weeks from the time of injury. I did answer all questions in the office. We did review the different treatment options, including non-surgical ones, in theory. I did obtain verbal/written informed consent for the outpatient hand surgery, while the patient was in the office. Past Medical History:   Diagnosis Date    Cervical (neck) region somatic dysfunction 08/02/2021    Cervical paraspinal muscle spasm 08/02/2021    COVID 12/23/2021    mild    Dizziness and giddiness 08/02/2021       Past Surgical History:   Procedure Laterality Date    WISDOM TOOTH EXTRACTION         Prior to Admission medications    Medication Sig Start Date End Date Taking?  Authorizing Provider   valACYclovir (VALTREX) 1 g tablet Take 1 tablet by mouth 2 times daily 4/25/22   Mirna Chanel, DO Pcn [penicillins]    History reviewed. No pertinent family history. Social History     Socioeconomic History    Marital status: Single     Spouse name: Not on file    Number of children: Not on file    Years of education: Not on file    Highest education level: Not on file   Occupational History    Not on file   Tobacco Use    Smoking status: Never Smoker    Smokeless tobacco: Current User     Types: Chew    Tobacco comment: rarely   Substance and Sexual Activity    Alcohol use: Yes     Comment: social    Drug use: No    Sexual activity: Not on file   Other Topics Concern    Not on file   Social History Narrative    Not on file     Social Determinants of Health     Financial Resource Strain: Low Risk     Difficulty of Paying Living Expenses: Not hard at all   Food Insecurity: No Food Insecurity    Worried About Running Out of Food in the Last Year: Never true    [address] in the Last Year: Never true   Transportation Needs:     Lack of Transportation (Medical): Not on file    Lack of Transportation (Non-Medical):  Not on file   Physical Activity:     Days of Exercise per Week: Not on file    Minutes of Exercise per Session: Not on file   Stress:     Feeling of Stress : Not on file   Social Connections:     Frequency of Communication with Friends and Family: Not on file    Frequency of Social Gatherings with Friends and Family: Not on file    Attends [employer] Services: Not on file    Active Member of [employer] or Organizations: Not on file    Attends Club or Organization Meetings: Not on file    Marital Status: Not on file   Intimate Partner Violence:     Fear of Current or Ex-Partner: Not on file    Emotionally Abused: Not on file    Physically Abused: Not on file    Sexually Abused: Not on file   Housing Stability:     Unable to Pay for Housing in the Last Year: Not on file    Number of [place] in the Last Year: Not on file    Unstable Housing in the Last Year: Not on file       Review of Systems:   CONSTITUTIONAL:  negative  EYES:  negative  HEENT:  negative  RESPIRATORY:  negative  CARDIOVASCULAR:  negative  GASTROINTESTINAL:  negative  GENITOURINARY:  negative  INTEGUMENT/BREAST:  negative  HEMATOLOGIC/LYMPHATIC:  negative  ALLERGIC/IMMUNOLOGIC:  negative  ENDOCRINE:  negative  MUSCULOSKELETAL:  negative  NEUROLOGICAL:  negative  BEHAVIOR/PSYCH:  negative    Physical Exam:  Vitals:    06/29/22 1434   Weight: 250 lb (113.4 kg)   Height: 6' 4\" (1.93 m)       CONSTITUTIONAL:  awake, alert, cooperative, no apparent distress, and appears stated age  EYES:  Lids and lashes normal, pupils equal, round and reactive to light, extra ocular muscles intact, sclera clear, conjunctiva normal  ENT:  Normocephalic, without obvious abnormality, atraumatic, sinuses nontender on palpation, external ears without lesions, oral pharynx with moist mucus membranes, tonsils without erythema or exudates, gums normal and good dentition. NECK:  Supple, symmetrical, trachea midline, no adenopathy, thyroid symmetric, not enlarged and no tenderness, skin normal  HEMATOLOGIC/LYMPHATICS:  no cervical lymphadenopathy  BACK:  Symmetric, no curvature, spinous processes are non-tender on palpation, paraspinous muscles are non-tender on palpation, no costal vertebral tenderness  LUNGS:  No increased work of breathing, good air exchange, clear to auscultation bilaterally, no crackles or wheezing  CARDIOVASCULAR:  normal S1 and S2  ABDOMEN:  deferred  CHEST/BREASTS:  deferred  GENITAL/URINARY:  deferred  MUSCULOSKELETAL: + Left ring finger bruising, mild to moderate at the DIP joint, circumferential but asymmetric on the palmar and radial and ulnar aspects of the joint. The findings are highly consistent with a bony associated injury.   + Pain, moderate left ring finger DIP joint.  - Flexion left ring finger DIP joint.  + Flexion left ring finger PIP (proximal interphalangeal) joint, indicative of intact FDS (flexor digitorum superficialis) tendon function. NEUROLOGIC:  Awake, alert, oriented to name, place and time. Cranial nerves II-XII are grossly intact. Motor is 5 out of 5 bilaterally. Cerebellar finger to nose, heel to shin intact. Sensory is intact. Babinski down going, Romberg negative, and gait is normal.  SKIN:  See above      Assessment:  Active Problems:    * No active hospital problems. *  Resolved Problems:    * No resolved hospital problems. *  1. Left ring finger injury w/ ruptured finger flexor tendon. Plan:  1. Left ring finger-palmar exploration  2. Left ring finger FDP (flexor digitorum profundus) repair, suspected tenodesis.     Electronically signed by David Parrish MD on 7/5/22 at 4:41 PM EDT

## 2022-07-05 NOTE — ANESTHESIA PRE PROCEDURE
Department of Anesthesiology  Preprocedure Note       Name:  Mumtaz Lee   Age:  33 y.o.  :  1995                                          MRN:  [id number]         Date:  2022      Surgeon: Gabriela Hutchinson):  Naila Mosquera MD    Procedure: Procedure(s):  LEFT RING PALMAR EXPLORATION, LEFT RING FLEXOR DIGITORUM PALMARIS (FDP) REPAIR\RECONSTRUCTION (CPT 35263)    Medications prior to admission:   Prior to Admission medications    Medication Sig Start Date End Date Taking? Authorizing Provider   valACYclovir (VALTREX) 1 g tablet Take 1 tablet by mouth 2 times daily 22   Abby Arenas DO       Current medications:    No current facility-administered medications for this encounter. Current Outpatient Medications   Medication Sig Dispense Refill    valACYclovir (VALTREX) 1 g tablet Take 1 tablet by mouth 2 times daily 20 tablet 1       Allergies:     Allergies   Allergen Reactions    Pcn [Penicillins] Rash       Problem List:    Patient Active Problem List   Diagnosis Code    Dizziness and giddiness R42    Cervical (neck) region somatic dysfunction M99.01    Cervical paraspinal muscle spasm M62.838       Past Medical History:        Diagnosis Date    Cervical (neck) region somatic dysfunction 2021    Cervical paraspinal muscle spasm 2021    COVID 2021    mild    Dizziness and giddiness 2021       Past Surgical History:        Procedure Laterality Date    WISDOM TOOTH EXTRACTION         Social History:    Social History     Tobacco Use    Smoking status: Never Smoker    Smokeless tobacco: Current User     Types: Chew    Tobacco comment: rarely   Substance Use Topics    Alcohol use: Yes     Comment: social                                Ready to quit: Not Answered  Counseling given: Not Answered  Comment: rarely      Vital Signs (Current):   Vitals:    22 1434   Weight: 250 lb (113.4 kg)   Height: 6' 4\" (1.93 m)                                              BP

## 2022-07-06 ENCOUNTER — ANESTHESIA (OUTPATIENT)
Dept: OPERATING ROOM | Age: 27
End: 2022-07-06
Payer: COMMERCIAL

## 2022-07-06 ENCOUNTER — HOSPITAL ENCOUNTER (OUTPATIENT)
Age: 27
Setting detail: OUTPATIENT SURGERY
Discharge: HOME | End: 2022-07-06
Attending: SURGERY | Admitting: SURGERY
Payer: COMMERCIAL

## 2022-07-06 VITALS
TEMPERATURE: 97 F | BODY MASS INDEX: 30.44 KG/M2 | OXYGEN SATURATION: 97 % | RESPIRATION RATE: 16 BRPM | DIASTOLIC BLOOD PRESSURE: 78 MMHG | HEART RATE: 75 BPM | WEIGHT: 250 LBS | HEIGHT: 76 IN | SYSTOLIC BLOOD PRESSURE: 132 MMHG

## 2022-07-06 DIAGNOSIS — S66.125A LACERATION OF FLEXOR MUSCLE, FASCIA AND TENDON OF LEFT RING FINGER AT WRIST AND HAND LEVEL, INITIAL ENCOUNTER: ICD-10-CM

## 2022-07-06 PROCEDURE — 6360000002 HC RX W HCPCS: Performed by: SURGERY

## 2022-07-06 PROCEDURE — 2580000003 HC RX 258: Performed by: SURGERY

## 2022-07-06 PROCEDURE — 6370000000 HC RX 637 (ALT 250 FOR IP): Performed by: SURGERY

## 2022-07-06 PROCEDURE — 3700000000 HC ANESTHESIA ATTENDED CARE: Performed by: SURGERY

## 2022-07-06 PROCEDURE — 7100000000 HC PACU RECOVERY - FIRST 15 MIN: Performed by: SURGERY

## 2022-07-06 PROCEDURE — 88304 TISSUE EXAM BY PATHOLOGIST: CPT

## 2022-07-06 PROCEDURE — 3600000013 HC SURGERY LEVEL 3 ADDTL 15MIN: Performed by: SURGERY

## 2022-07-06 PROCEDURE — 2580000003 HC RX 258: Performed by: ANESTHESIOLOGY

## 2022-07-06 PROCEDURE — 3600000003 HC SURGERY LEVEL 3 BASE: Performed by: SURGERY

## 2022-07-06 PROCEDURE — 6370000000 HC RX 637 (ALT 250 FOR IP): Performed by: ANESTHESIOLOGY

## 2022-07-06 PROCEDURE — 2720000010 HC SURG SUPPLY STERILE: Performed by: SURGERY

## 2022-07-06 PROCEDURE — 2500000003 HC RX 250 WO HCPCS: Performed by: NURSE ANESTHETIST, CERTIFIED REGISTERED

## 2022-07-06 PROCEDURE — 7100000001 HC PACU RECOVERY - ADDTL 15 MIN: Performed by: SURGERY

## 2022-07-06 PROCEDURE — 7100000011 HC PHASE II RECOVERY - ADDTL 15 MIN: Performed by: SURGERY

## 2022-07-06 PROCEDURE — 7100000010 HC PHASE II RECOVERY - FIRST 15 MIN: Performed by: SURGERY

## 2022-07-06 PROCEDURE — 6360000002 HC RX W HCPCS: Performed by: NURSE ANESTHETIST, CERTIFIED REGISTERED

## 2022-07-06 PROCEDURE — 3700000001 HC ADD 15 MINUTES (ANESTHESIA): Performed by: SURGERY

## 2022-07-06 PROCEDURE — 2709999900 HC NON-CHARGEABLE SUPPLY: Performed by: SURGERY

## 2022-07-06 PROCEDURE — 2500000003 HC RX 250 WO HCPCS: Performed by: SURGERY

## 2022-07-06 RX ORDER — ONDANSETRON 2 MG/ML
INJECTION INTRAMUSCULAR; INTRAVENOUS PRN
Status: DISCONTINUED | OUTPATIENT
Start: 2022-07-06 | End: 2022-07-06 | Stop reason: SDUPTHER

## 2022-07-06 RX ORDER — HYDROCODONE BITARTRATE AND ACETAMINOPHEN 5; 325 MG/1; MG/1
1 TABLET ORAL EVERY 4 HOURS PRN
Qty: 21 TABLET | Refills: 0 | Status: SHIPPED | OUTPATIENT
Start: 2022-07-06 | End: 2022-07-11

## 2022-07-06 RX ORDER — DOXYCYCLINE HYCLATE 100 MG
100 TABLET ORAL 2 TIMES DAILY
Qty: 14 TABLET | Refills: 0 | Status: SHIPPED | OUTPATIENT
Start: 2022-07-06 | End: 2022-07-13

## 2022-07-06 RX ORDER — SODIUM CHLORIDE 0.9 % (FLUSH) 0.9 %
5-40 SYRINGE (ML) INJECTION EVERY 12 HOURS SCHEDULED
Status: DISCONTINUED | OUTPATIENT
Start: 2022-07-06 | End: 2022-07-06 | Stop reason: HOSPADM

## 2022-07-06 RX ORDER — DEXAMETHASONE SODIUM PHOSPHATE 10 MG/ML
INJECTION, SOLUTION INTRAMUSCULAR; INTRAVENOUS PRN
Status: DISCONTINUED | OUTPATIENT
Start: 2022-07-06 | End: 2022-07-06 | Stop reason: SDUPTHER

## 2022-07-06 RX ORDER — PROPOFOL 10 MG/ML
INJECTION, EMULSION INTRAVENOUS PRN
Status: DISCONTINUED | OUTPATIENT
Start: 2022-07-06 | End: 2022-07-06 | Stop reason: SDUPTHER

## 2022-07-06 RX ORDER — MIDAZOLAM HYDROCHLORIDE 1 MG/ML
INJECTION INTRAMUSCULAR; INTRAVENOUS PRN
Status: DISCONTINUED | OUTPATIENT
Start: 2022-07-06 | End: 2022-07-06 | Stop reason: SDUPTHER

## 2022-07-06 RX ORDER — CYCLOBENZAPRINE HCL 5 MG
10 TABLET ORAL 2 TIMES DAILY PRN
Qty: 11 TABLET | Refills: 0 | Status: SHIPPED | OUTPATIENT
Start: 2022-07-06 | End: 2022-07-16

## 2022-07-06 RX ORDER — LIDOCAINE HYDROCHLORIDE 20 MG/ML
INJECTION, SOLUTION INFILTRATION; PERINEURAL PRN
Status: DISCONTINUED | OUTPATIENT
Start: 2022-07-06 | End: 2022-07-06 | Stop reason: SDUPTHER

## 2022-07-06 RX ORDER — FENTANYL CITRATE 50 UG/ML
INJECTION, SOLUTION INTRAMUSCULAR; INTRAVENOUS PRN
Status: DISCONTINUED | OUTPATIENT
Start: 2022-07-06 | End: 2022-07-06 | Stop reason: SDUPTHER

## 2022-07-06 RX ORDER — SODIUM CHLORIDE 0.9 % (FLUSH) 0.9 %
5-40 SYRINGE (ML) INJECTION PRN
Status: DISCONTINUED | OUTPATIENT
Start: 2022-07-06 | End: 2022-07-06 | Stop reason: HOSPADM

## 2022-07-06 RX ORDER — SODIUM CHLORIDE, SODIUM LACTATE, POTASSIUM CHLORIDE, CALCIUM CHLORIDE 600; 310; 30; 20 MG/100ML; MG/100ML; MG/100ML; MG/100ML
INJECTION, SOLUTION INTRAVENOUS CONTINUOUS
Status: DISCONTINUED | OUTPATIENT
Start: 2022-07-06 | End: 2022-07-06 | Stop reason: HOSPADM

## 2022-07-06 RX ORDER — DIPHENHYDRAMINE HYDROCHLORIDE 50 MG/ML
INJECTION INTRAMUSCULAR; INTRAVENOUS PRN
Status: DISCONTINUED | OUTPATIENT
Start: 2022-07-06 | End: 2022-07-06 | Stop reason: SDUPTHER

## 2022-07-06 RX ORDER — GINSENG 100 MG
CAPSULE ORAL PRN
Status: DISCONTINUED | OUTPATIENT
Start: 2022-07-06 | End: 2022-07-06 | Stop reason: ALTCHOICE

## 2022-07-06 RX ORDER — KETAMINE HYDROCHLORIDE 10 MG/ML
INJECTION, SOLUTION INTRAMUSCULAR; INTRAVENOUS PRN
Status: DISCONTINUED | OUTPATIENT
Start: 2022-07-06 | End: 2022-07-06 | Stop reason: SDUPTHER

## 2022-07-06 RX ORDER — FENTANYL CITRATE 0.05 MG/ML
25 INJECTION, SOLUTION INTRAMUSCULAR; INTRAVENOUS EVERY 5 MIN PRN
Status: DISCONTINUED | OUTPATIENT
Start: 2022-07-06 | End: 2022-07-06 | Stop reason: HOSPADM

## 2022-07-06 RX ORDER — SODIUM CHLORIDE 9 MG/ML
INJECTION, SOLUTION INTRAVENOUS PRN
Status: DISCONTINUED | OUTPATIENT
Start: 2022-07-06 | End: 2022-07-06 | Stop reason: HOSPADM

## 2022-07-06 RX ORDER — ONDANSETRON 2 MG/ML
4 INJECTION INTRAMUSCULAR; INTRAVENOUS
Status: DISCONTINUED | OUTPATIENT
Start: 2022-07-06 | End: 2022-07-06 | Stop reason: HOSPADM

## 2022-07-06 RX ORDER — HYDROCODONE BITARTRATE AND ACETAMINOPHEN 5; 325 MG/1; MG/1
1 TABLET ORAL ONCE
Status: COMPLETED | OUTPATIENT
Start: 2022-07-06 | End: 2022-07-06

## 2022-07-06 RX ADMIN — DIPHENHYDRAMINE HYDROCHLORIDE 12.5 MG: 50 INJECTION, SOLUTION INTRAMUSCULAR; INTRAVENOUS at 11:30

## 2022-07-06 RX ADMIN — SODIUM CHLORIDE, POTASSIUM CHLORIDE, SODIUM LACTATE AND CALCIUM CHLORIDE: 600; 310; 30; 20 INJECTION, SOLUTION INTRAVENOUS at 09:23

## 2022-07-06 RX ADMIN — VANCOMYCIN HYDROCHLORIDE 1000 MG: 1 INJECTION, POWDER, LYOPHILIZED, FOR SOLUTION INTRAVENOUS at 09:23

## 2022-07-06 RX ADMIN — ONDANSETRON 4 MG: 2 INJECTION INTRAMUSCULAR; INTRAVENOUS at 11:52

## 2022-07-06 RX ADMIN — FENTANYL CITRATE 100 MCG: 50 INJECTION, SOLUTION INTRAMUSCULAR; INTRAVENOUS at 10:16

## 2022-07-06 RX ADMIN — FENTANYL CITRATE 50 MCG: 50 INJECTION, SOLUTION INTRAMUSCULAR; INTRAVENOUS at 11:30

## 2022-07-06 RX ADMIN — KETAMINE HYDROCHLORIDE 30 MG: 10 INJECTION INTRAMUSCULAR; INTRAVENOUS at 10:20

## 2022-07-06 RX ADMIN — HYDROCODONE BITARTRATE AND ACETAMINOPHEN 1 TABLET: 5; 325 TABLET ORAL at 13:14

## 2022-07-06 RX ADMIN — PROPOFOL 300 MG: 10 INJECTION, EMULSION INTRAVENOUS at 10:16

## 2022-07-06 RX ADMIN — DEXAMETHASONE SODIUM PHOSPHATE 10 MG: 10 INJECTION, SOLUTION INTRAMUSCULAR; INTRAVENOUS at 10:22

## 2022-07-06 RX ADMIN — MIDAZOLAM 2 MG: 1 INJECTION INTRAMUSCULAR; INTRAVENOUS at 10:14

## 2022-07-06 RX ADMIN — LIDOCAINE HYDROCHLORIDE 40 MG: 20 INJECTION, SOLUTION INFILTRATION; PERINEURAL at 10:16

## 2022-07-06 RX ADMIN — FENTANYL CITRATE 100 MCG: 50 INJECTION, SOLUTION INTRAMUSCULAR; INTRAVENOUS at 10:38

## 2022-07-06 ASSESSMENT — PAIN SCALES - GENERAL
PAINLEVEL_OUTOF10: 8
PAINLEVEL_OUTOF10: 0
PAINLEVEL_OUTOF10: 6
PAINLEVEL_OUTOF10: 5
PAINLEVEL_OUTOF10: 0

## 2022-07-06 ASSESSMENT — PAIN DESCRIPTION - DESCRIPTORS
DESCRIPTORS: THROBBING
DESCRIPTORS: NUMBNESS
DESCRIPTORS: THROBBING

## 2022-07-06 ASSESSMENT — PAIN DESCRIPTION - PAIN TYPE: TYPE: SURGICAL PAIN

## 2022-07-06 ASSESSMENT — PAIN DESCRIPTION - ORIENTATION
ORIENTATION: LEFT
ORIENTATION: LEFT

## 2022-07-06 ASSESSMENT — PAIN - FUNCTIONAL ASSESSMENT: PAIN_FUNCTIONAL_ASSESSMENT: 0-10

## 2022-07-06 ASSESSMENT — PAIN DESCRIPTION - LOCATION: LOCATION: HAND

## 2022-07-06 NOTE — ANESTHESIA POSTPROCEDURE EVALUATION
Department of Anesthesiology  Postprocedure Note    Patient: Heriberto العراقي  MRN: [id number]  YOB: 1995  Date of evaluation: 7/6/2022      Procedure Summary     Date: 07/06/22 Room / Location: [address] [place] [address] 01 / [facility]    Anesthesia Start: 1010 Anesthesia Stop: 4509    Procedure: LEFT RING PALMAR EXPLORATION, LEFT RING FLEXOR DIGITORUM PALMARIS (FDP) REPAIR\RECONSTRUCTION (CPT 15702) (Left Wrist) Diagnosis:       Laceration of flexor muscle, fascia and tendon of left ring finger at wrist and hand level, initial encounter      (Laceration of flexor muscle, fascia and tendon of left ring finger at wrist and hand level, initial encounter [E57.104L])    Surgeons: Viv Edward MD Responsible Provider: Cachorro Dash MD    Anesthesia Type: General ASA Status: 1          Anesthesia Type: General    Graciela Phase I: Graciela Score: 4    Graciela Phase II:        Anesthesia Post Evaluation    Patient location during evaluation: PACU  Patient participation: complete - patient participated  Level of consciousness: awake  Pain score: 0  Airway patency: patent  Nausea & Vomiting: no nausea  Complications: no  Cardiovascular status: hemodynamically stable  Respiratory status: acceptable  Hydration status: stable  Multimodal analgesia pain management approach

## 2022-07-06 NOTE — ADDENDUM NOTE
Addendum  created 07/06/22 1306 by Bethany Rinaldi MD    Order list changed This office note has been dictated.  Medical assistant history and information reviewed.  Past Medical History:   Diagnosis Date   • Backache, unspecified 2010   • Carpal tunnel syndrome 2004   • Esophageal reflux    • Inflammatory bowel disease    • Lipidoses    • Lumbago    • OSTEOARTHROS NOS-HAND 2004   • Osteoarthrosis, unspecified whether generalized or localized, hand 2004   • Osteoarthrosis, unspecified whether generalized or localized, pelvic region and thigh 3/20/2013   • Osteoarthrosis, unspecified whether generalized or localized, pelvic region and thigh 3/20/2013   • Osteoarthrosis, unspecified whether generalized or localized, unspecified site    • Personal history of traumatic fracture    • PONV (postoperative nausea and vomiting)    • Refraction error 2013   • Rhinitis 3/23/2015   • S/P total hip arthroplasty 2013   • Unspecified backache 2010     Family History   Problem Relation Age of Onset   • Cancer Father         stomach ca   • Cancer Mother         skin   • Arthritis Mother    • Stroke/TIA Mother    • Dementia/Alzheimers Mother    • Osteoporosis Mother    • Cancer Sister         Skin   • Osteoporosis Sister    • Osteoporosis Sister    • Cancer Sister         skin   • Osteoporosis Sister    • High cholesterol Brother    • Gastrointestinal Brother    • Osteoporosis Maternal Grandmother    • Stroke Paternal Grandmother    • Stroke Paternal Grandfather    • NEGATIVE FAMILY HX OF Other         no ov or breast no dm or htn   • Cancer, Breast Neg Hx         cousin      Family Status   Relation Name Status   • Fa   at age 83        Stomach Cancer   • Mo     • Cristal Sosa Alive   • Cristal Chen Alive   • Sis Nuzhat Alive   • Cornelio Mullins Alive   • Cornelio Self Alive   • MGMo     • MGFa     • PGMo     • PGFa     • Son  Alive   • Son  Alive   • Ninfa  Alive   • OTHER  (Not Specified)   • NEG HX  (Not Specified)     Past Surgical History:    Procedure Laterality Date   • Appendectomy  1987   • Colonoscopy remove lesions by snare  5/5/09    Dr. Culp, Polyps, F/U 5 years   • Colonoscopy remove lesions by snare  11/13/12    Dr. Culp, Hemorrhoids/Polyps, F/U 5 years   • Colonoscopy remove lesions by snare  10/02/2018    Dr. Culp, Hyperplastic Polyps/Internal Hemorrhoids F/u 10 yrs   • Colonoscopy w biopsy  3/4/04    Dr Culp polyps f/u 5yrs   • Esophagogastroduodenoscopy transoral flex diag  3/4/04    Dr Culp esophagitis started on PPI therapy   • Hip surgery  5/7/13    R total hip   • Service to gastroenterology     • [place] tooth extraction       Social History     Socioeconomic History   • Marital status: /Civil Union     Spouse name: Not on file   • Number of children: Not on file   • Years of education: Not on file   • Highest education level: Not on file   Occupational History   • Not on file   Tobacco Use   • Smoking status: Current Some Day Smoker     Packs/day: 0.10     Years: 20.00     Pack years: 2.00     Types: Cigarettes   • Smokeless tobacco: Never Used   • Tobacco comment: on and off   Substance and Sexual Activity   • Alcohol use: Yes     Alcohol/week: 2.0 - 3.0 standard drinks     Types: 2 - 3 Standard drinks or equivalent per week     Comment: occasional social   • Drug use: No   • Sexual activity: Yes     Partners: Male     Birth control/protection: Surgical     Comment: vasectomy.  1997   Other Topics Concern   • Not on file   Social History Narrative    [employer]     Social Determinants of Health     Financial Resource Strain:    • Social Determinants: Financial Resource Strain: Not on file   Food Insecurity:    • Social Determinants: Food Insecurity: Not on file   Transportation Needs:    • Lack of Transportation (Medical): Not on file   • Lack of Transportation (Non-Medical): Not on file   Physical Activity:    • Days of Exercise per Week: Not on file   • Minutes of Exercise per Session: Not on file    Stress:    • Social Determinants: Stress: Not on file   Social Connections:    • Social Determinants: Social Connections: Not on file   Intimate Partner Violence:    • Social Determinants: Intimate Partner Violence Past Fear: Not on file   • Social Determinants: Intimate Partner Violence Current Fear: Not on file     OB History    Para Term  AB Living   3 3 0 0 0 3   SAB IAB Ectopic Molar Multiple Live Births   0 0 0 0 0 0

## 2022-07-06 NOTE — OP NOTE
Operative Note      Patient: Fredi Briones  YOB: 1995  MRN: [id number]    Date of Procedure: 7/6/2022    Pre-Op Diagnosis:   1. Left tan, ring finger-FDP (flexor digitorum profundus) complete rupture, at insertion site. [P82.815J]    Post-Op Diagnosis: Same       Procedure(s):  1. LEFT RING finger- PALMAR EXPLORATION  2. LEFT RING- FDP REPAIR/ tenodesis (CPT 89019)    Surgeon(s):  Chris Taylor M.D.; Hand + Reconstructive Surgery    Assistant:   * No surgical staff found *    Anesthesia: General    Estimated Blood Loss (mL): Minimal 3 ml    Complications: None    Specimens:   ID Type Source Tests Collected by Time Destination   A : Left ring finger flexor tendon end Tissue Tissue SURGICAL PATHOLOGY Fe Garcia MD 7/6/2022 1110        Implants:  * No implants in log *      Drains: * No LDAs found *    OPERATIVE NOTE :      FINGER FLEXOR TENDON REPAIR/ TENODESIS    Indications: This is a 33years of age, right-hand-dominant, white male, who presented to our [facility] office on 06/27/2022. Patient states that he had an injury to the left ring finger 5 days prior, while at home. Mary Norwood has 5 dogs, and he was fixing a chain, when the little dog a 11year-old female got mixed up with his 65 pound pitbull mix. As they were running past him he attempted to grab the dog's, and suffered a hyperextension injury of the left ring finger, in particular. The other digits were uninjured. The right hand was also uninjured. Patient had severe pain, isolated to the left ring finger DIP (distal interphalangeal) joint. He did have associated mild bruising at the same joint level which was mostly palmar and on the sides of the joint. Patient denies any type of open wound. He does have mild associated diffuse edema of the left ring finger down to the base of the digit. This is asymmetric swelling when compared to the ipsilateral digits of the hand.   On physical examination, the patient has full flexion at the PIP (proximal interphalangeal) joint, and no flexion noted at the DIP joint. This is highly consistent with a complete rupture of the FDP (flexor digitorum profundus) tendon. The patient did not want surgical exploration, if at all possible. My strong recommendation is that he undergo outpatient hand surgery, for palmar digital exploration and flexor tendon, in this case FDP tendon repair/probable tenodesis. We did obtain verbal/written informed consent, the patient was in the office. All questions were answered at that time. The patient did understand that he may actually have a tendon repair, tenodesis, tendon reconstruction, or a 2-stage flexor tendon reconstruction-as possibilities. I did meet Chelsea Ureña in the preoperative holding room to again answer questions and outline the operation. The patient did receive a right upper extremity IV placement per nursing and pre-operative antibiotics since this was an open joint case. I did marilynn the surgery site with a purple marker my initials. We did obtain written informed consent for the surgery center and surgery team, in the preoperative holding room. I again answered all questions. We again outlined that there is possible need for secondary surgery for tightening or loosening of the flexor tendon. If there is rerupture, then the repair/reconstructive options, that were discussed previously would be entertained. The patient was escorted back to the surgery suite and laid supine on the operating table. The patient underwent IV sedation per the anesthesia team followed by general anesthesia with laryngeal mask airway. After the tube was secured, the operating bed was rotated 90 degrees with anesthesia workbench and the left upper extremity was abducted 70 degrees with the patient's body and placed onto a padded arm table. We did have nonsterile Webril and an 18 inch tourniquet on the upper arm.   The tourniquet was inflated to 250 mmHg distal end was partially ruptured through the flexor tendon tract just by shear mass affect. I did create a small window at the proximal A3 pulley level to expose the flexor tendon tract. There was contracture, secondary to the injury. I was able to use a curved mosquito/Long hemostat to grasp the end of the FDP tendon and fully stretch it out to the palmar base of the P3 bone. Any type of tendon graft or reconstruction was deemed unnecessary at this point. There was severe/extreme swelling of the tenosynovium/vincula/periosteum of the P2 and P3 levels. I was able to use Beverlyn Shank hemostats and a right angle dissector to recreate the flexor tendon tunnel. The FDP tendon was essentially 3 times the size of the tunnel, and even with the neck step of cleaning out the tunnel from some of this edematous tissue, sparing the radial and ulnar distal slips of the FDS (flexor digitorum superficialis) tendon, there still was not not nearly enough room to feed the tendon underneath the C1 and A4/A5 pulleys. At this point, I did plan an A4 pulley reconstruction using parts of the palmar flexor tendon tract. I then used a tongue blade a #11 blade scalpel to trim the distal end of the FDP tendon and send this along with some of the edematous vincula to pathology for permanent section. I then used my standard [place], 3-0 nylon suture. I then decorticated the palmar base of the P3 bone with a small rongeur forceps. The volar plate of the DIP joint could be salvaged and was of good integrity. I then used the Medify battery-operated drill to drive to Jackelyn Dowdy needles through the palmar base of the P3 bone in an oblique fashion from palmar to dorsal and proximal to distal.  I then was able to feed the free ends of the 3-0 nylon suture through the eyelets of the Jackelyn Dowdy needles before bringing the needles out dorsally. I then fashioned the Saud/Dakota button,, my a sterile scrub sponge.   This could add extra padding, to prevent necrosis on the dorsal P3 skin. After double checking the tendon through the flexor tendon tract, over the volar plate at the DIP joint, and then snug into the palmar base of the P3 bone I was unable to compress the sponge and tie the suture in place. Returning to the palmar wound, I then reconstructed the A4 pulley using a square from the A3 level sewn to the A4 pulley for a wider diameter, at this level. I then passively extended the digit, noting flexor tendon smooth glide. I did irrigate the wound out with normal saline. I was able to close the skin layer using interrupted horizontal mattress, 4-0 nylon suture. At the conclusion of the case the sponge instrument needle counts were correct x2. The extremity was wiped clean of Betadine and blood my standard light digit pressure dressing, with a soft dressing extending onto the hand wrist and distal forearm, was then applied including a dorsal blocking 4 inch plaster splint followed by 4 inch Ace bandage. The patient tolerated the operation well was sent to the postanesthesia care unit in stable fair condition, escorted by myself, RN, and CRNA.   Electronically signed by Roseann Palmer MD on 7/6/2022 at 12:26 PM

## 2022-07-20 ENCOUNTER — EVALUATION (OUTPATIENT)
Dept: OCCUPATIONAL THERAPY | Age: 27
End: 2022-07-20
Payer: COMMERCIAL

## 2022-07-20 DIAGNOSIS — S66.125A LACERATION OF FLEXOR MUSCLE, FASCIA AND TENDON OF LEFT RING FINGER AT WRIST AND HAND LEVEL, INITIAL ENCOUNTER: Primary | ICD-10-CM

## 2022-07-20 PROCEDURE — 97530 THERAPEUTIC ACTIVITIES: CPT | Performed by: OCCUPATIONAL THERAPIST

## 2022-07-20 PROCEDURE — 97140 MANUAL THERAPY 1/> REGIONS: CPT | Performed by: OCCUPATIONAL THERAPIST

## 2022-07-20 PROCEDURE — 97165 OT EVAL LOW COMPLEX 30 MIN: CPT | Performed by: OCCUPATIONAL THERAPIST

## 2022-07-20 PROCEDURE — 97760 ORTHOTIC MGMT&TRAING 1ST ENC: CPT | Performed by: OCCUPATIONAL THERAPIST

## 2022-07-20 NOTE — PROGRESS NOTES
OCCUPATIONAL THERAPY INITIAL EVALUATION    [address]  [facility] OCCUPATIONAL THERAPY  [address]   [place] [address]  Dept: [phone]  Loc: [address] [place] Fax: [phone]    Date:  2022  Initial Evaluation Date: 22   Evaluating Therapist: Jade Brooks OT    Patient Name:  Cassie Roldan    :  1995    Restrictions/Precautions:  follow flexor tendon protocol, low fall risk  Diagnosis:  W89.767D (ICD-10-CM) - Laceration of flexor muscle, fascia and tendon of left ring finger at wrist and hand level, initial encounter       Date of Surgery/Injury: 09    Insurance/Certification information:  [employer]  Plan of care signed (Y/N): N  Visit# / total visits:     Referring Practitioner:  Ángel Zuniga MD  Specific Practitioner Orders: eval and treat    Assessment of current deficits   [] Functional mobility  [x] ADLs  [x] Strength   [] Cognition   [] Functional transfers   [x] IADLs  [] Safety Awareness  [x] Endurance   [x] Fine Motor Coordination  [] Balance  [] Vision/perception  [] Sensation    [] Gross Motor Coordination [x] ROM  [] Pain   [x] Edema    [x] Scar Adhesion/Skin Integrity     OT PLAN OF CARE   OT POC based on physician orders, patient diagnosis and results of clinical assessment    Frequency/Duration 1-2x a week for 12visits  Specific OT Treatment to include:     [x] Instruction in HEP                   Modalities:  [x] Therapeutic Exercise        [x] Ultrasound               [] Electrical Stimulation/Attended  [x] PROM/Stretching                    [x] Fluidotherapy          [x]  Paraffin                   [x] AAROM  [x] AROM                 [] Iontophoresis:   [x] Tendon Glides                                               [] Neuromuscular Re-Ed            [] ADL/IADL re-training    [x] Therapeutic Activity       [x] Pain Management with/without modalities PRN [x] Manual Therapy                      [x] Splinting                      [x] Scar Management                   []Joint Protection/Training  []Ergonomics                             [x] Joint Mobilization        [] Adaptive Equipment Assessment/Training                             [x] Manual Edema Mobilization   [] Myofascial Release                 [] Energy Conservation/Work Simplification  [x] GM/FM Coordination       [] Safety retraining/education per  individual diagnosis/goals  [] Desensitization       Patient Specific Goal: to be able to walk his dogs                             GOALS (Long term same as Short term):  1) Patient will demonstrate good understanding of home program (exercises/activities/diagnosis/prognosis/goals) with good accuracy. 2) Patient will demonstrate increased active/passive range of motion of their L hand to [facility] for ADL/IADL completion. 3) Patient will demonstrate increased /pinch strength of at least 10 / 5 pinch pounds of their L hand to increase participation in work related activities. 4) Patient to report decreased pain in their affected L upper extremity from 6/10 to 2/10 or less with AROM and PROM exercies. 5) Increase in fine motor function as evidenced by decreased time to complete 9-hole peg test and/or MRMT test by at least 5-10 seconds to increase participation and independence with dressing tasks-buttons, zippers. 6) Patient to report 100% compliance with their splint wear, care, and precautions if needed. 7) Patient will be knowledgeable of edema control techniques as evident with decreases from moderate to mild/none. 8) Patient will demonstrate a non-tender/non-adherent scar.    9) Patient will decrease QuickDASH score to 25% or less for increased participation in daily functional activities    Past Medical History:   Past Medical History:   Diagnosis Date    Cervical (neck) region somatic dysfunction 08/02/2021    Cervical paraspinal muscle spasm 08/02/2021    COVID 12/23/2021    mild    Dizziness and giddiness 08/02/2021     Past Surgical History:   Past Surgical History:   Procedure Laterality Date    ARM SURGERY Left 7/6/2022    LEFT RING PALMAR EXPLORATION, LEFT RING FLEXOR DIGITORUM PALMARIS (FDP) REPAIR\RECONSTRUCTION (CPT 20203) performed by Anali Vazquez MD at [facility]         Reason for Referral: Pt is a 33year old male who presents this date for initial occupational evaluation. Pt suffered a tendon rupture to his L RF when he tried to grab his dogs collar. Pt underwent surgical intervention on 7/6/22 for tendon repair. Pt presents this date wearing his post op dressing. Home Living: with colt, 11year old daughter and 5 dogs  Prior Level of Function: independent     Cognition:   Alert/Oriented x3     IADL STATUS:   Ind Mod I Min A Mod A Max A Dep Other   Homemaking Responsibility   x       Shopping Responsibility:   x       Mode of Transportation:  x        Leisure & Hobbies:  x        Work:       x     Comments: Pt is a  and is currently off of work, pt requires assistance from his fiaaron for cleaning and cooking at home and is completing all IADLs with right dominant hand. ADL STATUS:   Ind Mod I Min A Mod A Max A Dep Other   Feeding: x         Grooming: x         Bathing: x         UE Dressing:  x        LE Dressing:  x        Toileting: x         Transfers: x           Comments: Pt reports increased difficulty with buttons and zippers, currently avoiding fine motor tasks. Pain Level: no pain reported at rest or with PROM or exercises    UE Assessment: Pt presents this date in a fiberglass splint fabricated by physicians office. Pt able to doff splint independently-new DBS fabricated in protected position. No measurements taken this date due to time limitations as well as splinting and HEP priority. Will assess measurements when appropriate.      Proper protective positioning achieved with wrist in ~20* of flexion, ~60* of MCP flexion and PIP and DIP extension within the splint of the RF and SF. Will assess further next session. Comment: Hand Dominance is right    Sensation: no numbness or tingling noted     Edema Description/Circumferential Measurements:   Moderate edema present throughout the hand and digits  Dynamometer (setting 2): Not tested due to protocol, to assess when appropriate      Left: NT      Right: NT    Pinch Meter:   Lateral: Left= NT,Right= NT    Palmar 3 point: Left= NT, Right= NT  9 Hole Peg Test:   Left: NT   Right: NT    QuickDASH Score: not assessed    Intervention: PROM performed to the digits to increase PIP and DIP extension in protected position (wrist and MCPs in flexion) to increase proper positioning in the splint. Wound Care: post operative dressing removed-pt states that the physician instructed not to remove the dressing on the RF or the button-did not remove dressing per physician      Splint Fabricated/Provided: Dorsal blocking splint with SF and RF involvement      Education Provided: Patient was provided with verbal education/handout regarding splint care, wear, precautions, and hygiene     Splint Care: Splint can be washed with mild soap and cool water. Splint needs to air dry. Avoid leaving splint in or near a source of heat such as a hot car or a space heater. Use nail polish remover to remove stains on splint. Use Purell will decrease any odor that may develop.      Splint Precuations: Patient was instructed to remove splint and contact therapist if the following occur:              1. Redness that lasts longer that 15-20 mins              2. Increased swelling              3. Increased pain              4. Pressure Sores     Wear Schedule: 24/7 except for exercises and wound care       Eval Complexity: low  Profile and History- Chart reviewed  Assessment of Occupational Performance and Identification of Deficits- 8 Clinical Decision Making- no additional modifications required    Rehab Potential:                                 [x] Good  [] Fair  [] Poor        Suggested Professional Referral:       [x] No  [] Yes:  Barriers to Goal Achievement[de-identified]          [x] No  [] Yes:  Domestic Concerns:                           [x] No  [] Yes:       Patient. Education:  [x] Plans/Goals, Risks/Benefits discussed  [x] Home exercise program  Method of Education: [x] Verbal  [x] Demo  [x] Written  Comprehension of Education:  [x] Verbalizes understanding. [x] Demonstrates understanding. [] Needs Review. [] Demonstrates/verbalizes understanding of HEP/Ed previously given. Patient understands diagnosis/prognosis and consents to treatment, plan and goals: [x] Yes    [] No     Goal Formulation: Patient  Time In: 9:00a            Time Out: 10:00                      Timed Code Treatment Minutes: 30 minutes      CODE  Minutes  Units   16227 OT Eval Low 20 1   12813 OT Eval Medium     93833 OT Eval High     99334 Fluidotherapy     98964 Manual 10 1   89320 Therapeutic Ex     12686 Therapeutic Activity 10 1   73409 ADL/COMP Tech Train     [phone] Neuromuscular Re-Ed     80101 OrthoManagementTraining 20 1   55788 Paraffin     67978 Electrical Stim - Attended     [id number] Iontophoresis     56658 Ultrasound      Other       60 4        Electronically signed by: Lana Allred [place] #[id number]     [id number] Certification / Comments      Frequency/Duration 1-2/ week for 12 visits. Certification period From: 7/20/22  To: 10/20/22     I have reviewed the Plan of Care established for skilled therapy services and certify that the services are required and that they will be provided while the patient is under my care.      Physician's Comments/Revisions:                   Physicians's Printed Name:  Gege Rosario MD                                   Physician's Signature:                                                               Date:

## 2022-07-26 ENCOUNTER — TREATMENT (OUTPATIENT)
Dept: OCCUPATIONAL THERAPY | Age: 27
End: 2022-07-26
Payer: COMMERCIAL

## 2022-07-26 DIAGNOSIS — S66.125A LACERATION OF FLEXOR MUSCLE, FASCIA AND TENDON OF LEFT RING FINGER AT WRIST AND HAND LEVEL, INITIAL ENCOUNTER: Primary | ICD-10-CM

## 2022-07-26 PROCEDURE — 97140 MANUAL THERAPY 1/> REGIONS: CPT | Performed by: OCCUPATIONAL THERAPIST

## 2022-07-26 PROCEDURE — 97110 THERAPEUTIC EXERCISES: CPT | Performed by: OCCUPATIONAL THERAPIST

## 2022-07-26 NOTE — PROGRESS NOTES
[address]  [phone]    Date:  2022    Initial Evaluation Date: 22                                Evaluating Therapist: Beltran Zamudio OT     Patient Name:  Ayush Cordova                  :  1995     Restrictions/Precautions:  follow flexor tendon protocol, low fall risk  Diagnosis:  S66.125A (ICD-10-CM) - Laceration of flexor muscle, fascia and tendon of left ring finger at wrist and hand level, initial encounter                                                          Date of Surgery/Injury: 45     Insurance/Certification information:  [employer]  Plan of care signed (Y/N): N  Visit# / total visits:      Referring Practitioner:  Oleg Siddiqui MD  Specific Practitioner Orders: eval and treat     Assessment of current deficits  [] Functional mobility             [x] ADLs           [x] Strength                  [] Cognition  [] Functional transfers           [x] IADLs          [] Safety Awareness  [x] Endurance  [x] Fine Motor Coordination    [] Balance      [] Vision/perception    [] Sensation     [] Gross Motor Coordination [x] ROM           [] Pain                        [x] Edema          [x] Scar Adhesion/Skin Integrity     OT PLAN OF CARE   OT POC based on physician orders, patient diagnosis and results of clinical assessment     Frequency/Duration 1-2x a week for 12visits  Patient Specific Goal: to be able to walk his dogs                             GOALS (Long term same as Short term):  1) Patient will demonstrate good understanding of home program (exercises/activities/diagnosis/prognosis/goals) with good accuracy. 2) Patient will demonstrate increased active/passive range of motion of their L hand to [facility] for ADL/IADL completion. 3) Patient will demonstrate increased /pinch strength of at least 10 / 5 pinch pounds of their L hand to increase participation in work related activities.    4) Patient to report decreased pain in their affected L upper extremity from 6/10 to 2/10 or less with AROM and PROM exercies. 5) Increase in fine motor function as evidenced by decreased time to complete 9-hole peg test and/or MRMT test by at least 5-10 seconds to increase participation and independence with dressing tasks-buttons, zippers. 6) Patient to report 100% compliance with their splint wear, care, and precautions if needed. 7) Patient will be knowledgeable of edema control techniques as evident with decreases from moderate to mild/none. 8) Patient will demonstrate a non-tender/non-adherent scar. 9) Patient will decrease QuickDASH score to 25% or less for increased participation in daily functional activities     TODAY'S PROGRESS NOTE:    Pain Level: 2 on scale of 1-10, aching    Subjective: \"finger is doing pretty good. Mild pain. \"     Objective:  Updated POC to be completed by 10th visit. INTERVENTION: COMPLETED: SPECIFICS/COMMENTS:   Modality:               AROM:     fingers x Active ext only within confines of splint        AAROM:     Light place and holds x Light 1/2 fist place and hold started today. PROM/Stretching:     fingers s Dip, pip,composite passive flexion        Scar Mass/Edema Control:     Soft tissue/scar massage x Light scar /soft tissue massage. 10 min        Strengthening:               Other:     Splint adjustment x Increased dip/pip ext with splint. Assessment/Comments: Pt is making Good progress toward stated plan of care. Good charlie with exercises. Passive rom con'bertha.  Added light place and holds, 1/2 fist.     -Rehab Potential: Good    Billing:    TIME IN:          TIME OUT:           TOTAL TREATMENT TIME:          CODE   TODAY MINUTES TODAY UNITS     19166 Fluidotherapy         98869 Manual 30 2     81560 Therapeutic Ex 10 1     41729 Therapeutic Activity       92715 ADL/COMP Tech Train         01577 Neuromuscular Re-Ed       51332 OrthoManagementTraining           Modality: Other                                             TOTAL   40 3                    Goals: Goals for pt can be seen on initial evaluation. Plan:   [x]  Continues Plan of care: Treatment covered based on POC and graduated to patient's progress. Pt education continues at each visit to obtain maximum benefits from skilled OT intervention.   []  Alter Plan of care:   []  Discharge:      Natalie Agarwal OT/L, [address]

## 2022-07-29 ENCOUNTER — TREATMENT (OUTPATIENT)
Dept: OCCUPATIONAL THERAPY | Age: 27
End: 2022-07-29
Payer: COMMERCIAL

## 2022-07-29 DIAGNOSIS — S66.125A LACERATION OF FLEXOR MUSCLE, FASCIA AND TENDON OF LEFT RING FINGER AT WRIST AND HAND LEVEL, INITIAL ENCOUNTER: Primary | ICD-10-CM

## 2022-07-29 PROCEDURE — 97530 THERAPEUTIC ACTIVITIES: CPT | Performed by: OCCUPATIONAL THERAPIST

## 2022-07-29 PROCEDURE — 97110 THERAPEUTIC EXERCISES: CPT | Performed by: OCCUPATIONAL THERAPIST

## 2022-07-29 NOTE — PROGRESS NOTES
OCCUPATIONAL THERAPY PROGRESS NOTE/MEASUREMENTS [address] [facility]  [phone]    Date:  2022    Initial Evaluation Date: 22                                Evaluating Therapist: Lana Allred OT     Patient Name:  oJhn Chan                  :  1995     Restrictions/Precautions:  follow flexor tendon protocol, low fall risk  Diagnosis:  S66.125A (ICD-10-CM) - Laceration of flexor muscle, fascia and tendon of left ring finger at wrist and hand level, initial encounter                                                          Date of Surgery/Injury: 22 (3 weeks post)     Insurance/Certification information:  [employer]  Plan of care signed (Y/N): N  Visit# / total visits: 3 / 12     Referring Practitioner:  Gege Rosario MD  Specific Practitioner Orders: eval and treat     Assessment of current deficits  [] Functional mobility             [x] ADLs           [x] Strength                  [] Cognition  [] Functional transfers           [x] IADLs          [] Safety Awareness  [x] Endurance  [x] Fine Motor Coordination    [] Balance      [] Vision/perception    [] Sensation     [] Gross Motor Coordination [x] ROM           [] Pain                        [x] Edema          [x] Scar Adhesion/Skin Integrity     OT PLAN OF CARE   OT POC based on physician orders, patient diagnosis and results of clinical assessment     Frequency/Duration 1-2x a week for 12visits  Patient Specific Goal: to be able to walk his dogs                             GOALS (Long term same as Short term):  1) Patient will demonstrate good understanding of home program (exercises/activities/diagnosis/prognosis/goals) with good accuracy. 2) Patient will demonstrate increased active/passive range of motion of their L hand to [facility] for ADL/IADL completion.   3) Patient will demonstrate increased /pinch strength of at least 10 / 5 pinch pounds of their L hand to increase participation in work related activities. 4) Patient to report decreased pain in their affected L upper extremity from 6/10 to 2/10 or less with AROM and PROM exercies. 5) Increase in fine motor function as evidenced by decreased time to complete 9-hole peg test and/or MRMT test by at least 5-10 seconds to increase participation and independence with dressing tasks-buttons, zippers. 6) Patient to report 100% compliance with their splint wear, care, and precautions if needed. 7) Patient will be knowledgeable of edema control techniques as evident with decreases from moderate to mild/none. 8) Patient will demonstrate a non-tender/non-adherent scar. 9) Patient will decrease QuickDASH score to 25% or less for increased participation in daily functional activities     TODAY'S PROGRESS NOTE:    Pain Level: no pain reported    Subjective: \"It just gets super tight and it makes me mad because I want to use it. \"     Objective:  Updated POC to be completed by 10th visit. INTERVENTION: COMPLETED: SPECIFICS/COMMENTS:   Modality:     MHP  x 5 minutes during wound care to decrease stiffness and increase soft tissue elasticity        AROM:     fingers x Active ext only within confines of splint        AAROM:     Light place and holds x Light 1/2 fist place and hold started today.  -one second place and hold         PROM/Stretching:     fingers x Dip, pip,composite passive flexion        Scar Mass/Edema Control:     Soft tissue/scar massage  Light scar /soft tissue massage. 10 min        Strengthening:               Other:     Splint adjustment x Increased dip/pip ext with splint.   -added blue sponge for increased extension at the PIP joint. Wound care x -saline with q-tip - educated pt on skin integrity and keeping sponge and incision sites clean and dry     Assessment/Comments: Pt is making Good progress toward stated plan of care. Measurements were taken with active extension and passive flexion and can be seen in bold below.  Pt stated he feels extremely frustrated by stiffness in DIP joint. Pt educated on wound care and skin integrity in relation to sponge at tip of finger. Continue to progress per protocol. L  Hand ROM  *active extension passtive flexion   AROM [x]         PROM[x] 7/29/22        2nd Digit MCP Extension/Flexion   0-45 H /* 0/85*       PIP Extension/Flexion   0*/100* 0/99*       DIP Extension/Flexion   0*/70-90* 0/80*        3rd Digit MCP Extension/Flexion   0-45 H /* 0/84*       PIP Extension/Flexion   0*/100* 0/100*       DIP Extension/Flexion   0*/70-90* 0/64*        4th Digit MCP Extension/Flexion   0-45 H /* 0/90*       PIP Extension/Flexion   0*/100* -35/94*       DIP Extension/Flexion   0*/70-90* -/40*        5th Digit MCP Extension/Flexion   0-45 H /* 0/103*       PIP Extension/Flexion   0*/100* -6/96*       DIP Extension/Flexion   0*/70-90* 0/98*        -Rehab Potential: Good    Billing:    TIME IN:  8:05am        TIME OUT:   9:00am        TOTAL TREATMENT TIME:    55 minutes      CODE   TODAY MINUTES TODAY UNITS     08382 Fluidotherapy         07807 Manual       87813 Therapeutic Ex 25 1     65207 Therapeutic Activity 25 2     17438 ADL/COMP Tech Train         42609 Neuromuscular Re-Ed       82712 OrthoManagementTraining           Modality:           Other: MHP 5  0                                         TOTAL   50 3                    Goals: Goals for pt can be seen on initial evaluation. Plan:   [x]  Continues Plan of care: Treatment covered based on POC and graduated to patient's progress. Pt education continues at each visit to obtain maximum benefits from skilled OT intervention.   []  Alter Plan of care:   []  Discharge:    Alf Limon, S/OT  [place], [facility], OTR/L #[id number]

## 2022-08-02 ENCOUNTER — TREATMENT (OUTPATIENT)
Dept: OCCUPATIONAL THERAPY | Age: 27
End: 2022-08-02
Payer: COMMERCIAL

## 2022-08-02 DIAGNOSIS — S66.125A LACERATION OF FLEXOR MUSCLE, FASCIA AND TENDON OF LEFT RING FINGER AT WRIST AND HAND LEVEL, INITIAL ENCOUNTER: Primary | ICD-10-CM

## 2022-08-02 PROCEDURE — 97110 THERAPEUTIC EXERCISES: CPT | Performed by: OCCUPATIONAL THERAPIST

## 2022-08-02 PROCEDURE — 97530 THERAPEUTIC ACTIVITIES: CPT | Performed by: OCCUPATIONAL THERAPIST

## 2022-08-02 NOTE — PROGRESS NOTES
[address]  [phone]    Date:  2022    Initial Evaluation Date: 22                                Evaluating Therapist: Chad Morillo OT     Patient Name:  Terrell Root                  :  1995     Restrictions/Precautions:  follow flexor tendon protocol, low fall risk  Diagnosis:  S66.125A (ICD-10-CM) - Laceration of flexor muscle, fascia and tendon of left ring finger at wrist and hand level, initial encounter                                                          Date of Surgery/Injury: 22 (3 weeks post)     Insurance/Certification information:  [employer]  Plan of care signed (Y/N): N  Visit# / total visits:      Referring Practitioner:  Chad Renee MD  Specific Practitioner Orders: eval and treat     Assessment of current deficits  [] Functional mobility             [x] ADLs           [x] Strength                  [] Cognition  [] Functional transfers           [x] IADLs          [] Safety Awareness  [x] Endurance  [x] Fine Motor Coordination    [] Balance      [] Vision/perception    [] Sensation     [] Gross Motor Coordination [x] ROM           [] Pain                        [x] Edema          [x] Scar Adhesion/Skin Integrity     OT PLAN OF CARE   OT POC based on physician orders, patient diagnosis and results of clinical assessment     Frequency/Duration 1-2x a week for 12visits  Patient Specific Goal: to be able to walk his dogs                             GOALS (Long term same as Short term):  1) Patient will demonstrate good understanding of home program (exercises/activities/diagnosis/prognosis/goals) with good accuracy. 2) Patient will demonstrate increased active/passive range of motion of their L hand to [facility] for ADL/IADL completion. 3) Patient will demonstrate increased /pinch strength of at least 10 / 5 pinch pounds of their L hand to increase participation in work related activities.    4) Patient to report decreased pain in their affected L upper extremity from 6/10 to 2/10 or less with AROM and PROM exercies. 5) Increase in fine motor function as evidenced by decreased time to complete 9-hole peg test and/or MRMT test by at least 5-10 seconds to increase participation and independence with dressing tasks-buttons, zippers. 6) Patient to report 100% compliance with their splint wear, care, and precautions if needed. 7) Patient will be knowledgeable of edema control techniques as evident with decreases from moderate to mild/none. 8) Patient will demonstrate a non-tender/non-adherent scar. 9) Patient will decrease QuickDASH score to 25% or less for increased participation in daily functional activities     TODAY'S PROGRESS NOTE:    Pain Level: 0-2 pain reported    Subjective: \"Doing good, min discomfort\"     Objective:  Updated POC to be completed by 10th visit. INTERVENTION: COMPLETED: SPECIFICS/COMMENTS:   Modality:     MHP  x 5 minutes during wound care to decrease stiffness and increase soft tissue elasticity        AROM:     fingers x Pt. At 4 weeks added active light finger flex and ext with splint on. To perform 10x every hour. AAROMDiedre Yandy place and holds  Light 1/2 fist place and hold started today.  -one second place and hold    Tendodesis wrist and fingers x Tenodesis wrist and finger movement out of splint with therapist  . 15 x   PROM/Stretching:     fingers x Dip, pip,composite passive flexion        Scar Mass/Edema Control:     Soft tissue/scar massage  Light scar /soft tissue massage. 10 min        Strengthening:               Other:     Splint adjustment x Increased dip/pip ext with splint.   -added blue sponge for increased extension at the PIP joint. Wound care x -saline with q-tip - educated pt on skin integrity and keeping sponge and incision sites clean and dry     Assessment/Comments: Pt is making Good progress toward stated plan of care. Good charlie with exercises. Some soreness with pressure of sponge. Pt. To see doctor this week for follow up. Added active today at 4 weeks post op within confines of splint. L  Hand ROM  *active extension passtive flexion   AROM [x]         PROM[x] 7/29/22        2nd Digit MCP Extension/Flexion   0-45 H /* 0/85*       PIP Extension/Flexion   0*/100* 0/99*       DIP Extension/Flexion   0*/70-90* 0/80*        3rd Digit MCP Extension/Flexion   0-45 H /* 0/84*       PIP Extension/Flexion   0*/100* 0/100*       DIP Extension/Flexion   0*/70-90* 0/64*        4th Digit MCP Extension/Flexion   0-45 H /* 0/90*       PIP Extension/Flexion   0*/100* -35/94*       DIP Extension/Flexion   0*/70-90* -/40*        5th Digit MCP Extension/Flexion   0-45 H /* 0/103*       PIP Extension/Flexion   0*/100* -6/96*       DIP Extension/Flexion   0*/70-90* 0/98*        -Rehab Potential: Good    Billing:    TIME IN:  8:05am        TIME OUT:   9:00am        TOTAL TREATMENT TIME:    55 minutes      CODE   TODAY MINUTES TODAY UNITS     06772 Fluidotherapy         26948 Manual       06271 Therapeutic Ex 25 1     80109 Therapeutic Activity 25 2     41786 ADL/COMP Tech Train         14209 Neuromuscular Re-Ed       54837 OrthoManagementTraining           Modality:           Other: MHP 5  0                                         TOTAL   50 3                    Goals: Goals for pt can be seen on initial evaluation. Plan:   [x]  Continues Plan of care: Treatment covered based on POC and graduated to patient's progress. Pt education continues at each visit to obtain maximum benefits from skilled OT intervention.   []  Alter Plan of care:   []  Discharge:    Taylor Concepcion, S/OT  [facility], OTR/L #[id number]

## 2022-08-04 ENCOUNTER — TREATMENT (OUTPATIENT)
Dept: OCCUPATIONAL THERAPY | Age: 27
End: 2022-08-04
Payer: COMMERCIAL

## 2022-08-04 DIAGNOSIS — S66.125A LACERATION OF FLEXOR MUSCLE, FASCIA AND TENDON OF LEFT RING FINGER AT WRIST AND HAND LEVEL, INITIAL ENCOUNTER: Primary | ICD-10-CM

## 2022-08-04 PROCEDURE — 97140 MANUAL THERAPY 1/> REGIONS: CPT | Performed by: OCCUPATIONAL THERAPIST

## 2022-08-04 PROCEDURE — 97035 APP MDLTY 1+ULTRASOUND EA 15: CPT | Performed by: OCCUPATIONAL THERAPIST

## 2022-08-04 PROCEDURE — 97530 THERAPEUTIC ACTIVITIES: CPT | Performed by: OCCUPATIONAL THERAPIST

## 2022-08-04 NOTE — PROGRESS NOTES
[address]  [phone]    Date:  2022    Initial Evaluation Date: 22                                Evaluating Therapist: Beltran Zamudio OT     Patient Name:  Ayush Cordova                  :  1995     Restrictions/Precautions:  follow flexor tendon protocol, low fall risk  Diagnosis:  S66.125A (ICD-10-CM) - Laceration of flexor muscle, fascia and tendon of left ring finger at wrist and hand level, initial encounter                                                          Date of Surgery/Injury: 22 (3 weeks post)     Insurance/Certification information:  [employer]  Plan of care signed (Y/N): N  Visit# / total visits:      Referring Practitioner:  Oleg Siddiqui MD  Specific Practitioner Orders: eval and treat     Assessment of current deficits  [] Functional mobility             [x] ADLs           [x] Strength                  [] Cognition  [] Functional transfers           [x] IADLs          [] Safety Awareness  [x] Endurance  [x] Fine Motor Coordination    [] Balance      [] Vision/perception    [] Sensation     [] Gross Motor Coordination [x] ROM           [] Pain                        [x] Edema          [x] Scar Adhesion/Skin Integrity     OT PLAN OF CARE   OT POC based on physician orders, patient diagnosis and results of clinical assessment     Frequency/Duration 1-2x a week for 12visits  Patient Specific Goal: to be able to walk his dogs                             GOALS (Long term same as Short term):  1) Patient will demonstrate good understanding of home program (exercises/activities/diagnosis/prognosis/goals) with good accuracy. 2) Patient will demonstrate increased active/passive range of motion of their L hand to [facility] for ADL/IADL completion. 3) Patient will demonstrate increased /pinch strength of at least 10 / 5 pinch pounds of their L hand to increase participation in work related activities.    4) Patient plan of care. Cont'ed light active with some pull through. Scar tissue is limiting pull at the incision area. L  Hand ROM  *active extension passtive flexion   AROM [x]         PROM[x] 7/29/22        2nd Digit MCP Extension/Flexion   0-45 H /* 0/85*       PIP Extension/Flexion   0*/100* 0/99*       DIP Extension/Flexion   0*/70-90* 0/80*        3rd Digit MCP Extension/Flexion   0-45 H /* 0/84*       PIP Extension/Flexion   0*/100* 0/100*       DIP Extension/Flexion   0*/70-90* 0/64*        4th Digit MCP Extension/Flexion   0-45 H /* 0/90*       PIP Extension/Flexion   0*/100* -35/94*       DIP Extension/Flexion   0*/70-90* -/40*        5th Digit MCP Extension/Flexion   0-45 H /* 0/103*       PIP Extension/Flexion   0*/100* -6/96*       DIP Extension/Flexion   0*/70-90* 0/98*        -Rehab Potential: Good    Billing:    TIME IN:  8:05am        TIME OUT:   9:00am        TOTAL TREATMENT TIME:    55 minutes      CODE   TODAY MINUTES TODAY UNITS     84061 Fluidotherapy         67895 Manual 25 1     24918 Therapeutic Ex       00874 Therapeutic Activity 15 1     16567 ADL/COMP Tech Train         [phone] Neuromuscular Re-Ed       96538 OrthoManagementTraining           Modality:           Other: us  3.3/.8/ 8 1                                         TOTAL   48 3                    Goals: Goals for pt can be seen on initial evaluation. Plan:   [x]  Continues Plan of care: Treatment covered based on POC and graduated to patient's progress. Pt education continues at each visit to obtain maximum benefits from skilled OT intervention.   []  Alter Plan of care:   []  Discharge:    Irving Rose OT/L, [facility]

## 2022-08-09 ENCOUNTER — TREATMENT (OUTPATIENT)
Dept: OCCUPATIONAL THERAPY | Age: 27
End: 2022-08-09
Payer: COMMERCIAL

## 2022-08-09 ENCOUNTER — HOSPITAL ENCOUNTER (EMERGENCY)
Age: 27
Discharge: HOME | End: 2022-08-09
Payer: COMMERCIAL

## 2022-08-09 VITALS
HEIGHT: 76 IN | OXYGEN SATURATION: 98 % | HEART RATE: 66 BPM | RESPIRATION RATE: 20 BRPM | WEIGHT: 250 LBS | DIASTOLIC BLOOD PRESSURE: 71 MMHG | BODY MASS INDEX: 30.44 KG/M2 | SYSTOLIC BLOOD PRESSURE: 113 MMHG | TEMPERATURE: 98.5 F

## 2022-08-09 DIAGNOSIS — K29.00 ACUTE SUPERFICIAL GASTRITIS WITHOUT HEMORRHAGE: Primary | ICD-10-CM

## 2022-08-09 DIAGNOSIS — S66.125A LACERATION OF FLEXOR MUSCLE, FASCIA AND TENDON OF LEFT RING FINGER AT WRIST AND HAND LEVEL, INITIAL ENCOUNTER: Primary | ICD-10-CM

## 2022-08-09 LAB
ALBUMIN SERPL-MCNC: 4.6 G/DL (ref 3.5–5.2)
ALP BLD-CCNC: 96 U/L (ref 40–129)
ALT SERPL-CCNC: 37 U/L (ref 0–40)
ANION GAP SERPL CALCULATED.3IONS-SCNC: 10 MMOL/L (ref 7–16)
AST SERPL-CCNC: 22 U/L (ref 0–39)
BACTERIA: NORMAL /HPF
BASOPHILS ABSOLUTE: 0.04 E9/L (ref 0–0.2)
BASOPHILS RELATIVE PERCENT: 0.6 % (ref 0–2)
BILIRUB SERPL-MCNC: 0.3 MG/DL (ref 0–1.2)
BILIRUBIN URINE: NEGATIVE
BLOOD, URINE: NEGATIVE
BUN BLDV-MCNC: 16 MG/DL (ref 6–20)
CALCIUM SERPL-MCNC: 9.7 MG/DL (ref 8.6–10.2)
CHLORIDE BLD-SCNC: 102 MMOL/L (ref 98–107)
CLARITY: CLEAR
CO2: 26 MMOL/L (ref 22–29)
COLOR: YELLOW
CREAT SERPL-MCNC: 1.3 MG/DL (ref 0.7–1.2)
EOSINOPHILS ABSOLUTE: 0.08 E9/L (ref 0.05–0.5)
EOSINOPHILS RELATIVE PERCENT: 1.3 % (ref 0–6)
GFR AFRICAN AMERICAN: >60
GFR NON-AFRICAN AMERICAN: >60 ML/MIN/1.73
GLUCOSE BLD-MCNC: 79 MG/DL (ref 74–99)
GLUCOSE URINE: NEGATIVE MG/DL
HCT VFR BLD CALC: 42.6 % (ref 37–54)
HEMOGLOBIN: 15.4 G/DL (ref 12.5–16.5)
IMMATURE GRANULOCYTES #: 0.02 E9/L
IMMATURE GRANULOCYTES %: 0.3 % (ref 0–5)
KETONES, URINE: NEGATIVE MG/DL
LACTIC ACID: 0.9 MMOL/L (ref 0.5–2.2)
LEUKOCYTE ESTERASE, URINE: NEGATIVE
LYMPHOCYTES ABSOLUTE: 1.84 E9/L (ref 1.5–4)
LYMPHOCYTES RELATIVE PERCENT: 29.5 % (ref 20–42)
MCH RBC QN AUTO: 30.3 PG (ref 26–35)
MCHC RBC AUTO-ENTMCNC: 36.2 % (ref 32–34.5)
MCV RBC AUTO: 83.9 FL (ref 80–99.9)
MONOCYTES ABSOLUTE: 0.45 E9/L (ref 0.1–0.95)
MONOCYTES RELATIVE PERCENT: 7.2 % (ref 2–12)
NEUTROPHILS ABSOLUTE: 3.81 E9/L (ref 1.8–7.3)
NEUTROPHILS RELATIVE PERCENT: 61.1 % (ref 43–80)
NITRITE, URINE: NEGATIVE
PDW BLD-RTO: 12.2 FL (ref 11.5–15)
PH UA: 6 (ref 5–9)
PLATELET # BLD: 164 E9/L (ref 130–450)
PMV BLD AUTO: 10.4 FL (ref 7–12)
POTASSIUM REFLEX MAGNESIUM: 4.1 MMOL/L (ref 3.5–5)
PROTEIN UA: NEGATIVE MG/DL
RBC # BLD: 5.08 E12/L (ref 3.8–5.8)
RBC UA: NORMAL /HPF (ref 0–2)
SODIUM BLD-SCNC: 138 MMOL/L (ref 132–146)
SPECIFIC GRAVITY UA: 1.02 (ref 1–1.03)
TOTAL PROTEIN: 6.9 G/DL (ref 6.4–8.3)
UROBILINOGEN, URINE: 0.2 E.U./DL
WBC # BLD: 6.2 E9/L (ref 4.5–11.5)
WBC UA: NORMAL /HPF (ref 0–5)

## 2022-08-09 PROCEDURE — 36415 COLL VENOUS BLD VENIPUNCTURE: CPT

## 2022-08-09 PROCEDURE — 83605 ASSAY OF LACTIC ACID: CPT

## 2022-08-09 PROCEDURE — 81001 URINALYSIS AUTO W/SCOPE: CPT

## 2022-08-09 PROCEDURE — 97530 THERAPEUTIC ACTIVITIES: CPT | Performed by: OCCUPATIONAL THERAPIST

## 2022-08-09 PROCEDURE — 80053 COMPREHEN METABOLIC PANEL: CPT

## 2022-08-09 PROCEDURE — 96375 TX/PRO/DX INJ NEW DRUG ADDON: CPT

## 2022-08-09 PROCEDURE — 2580000003 HC RX 258: Performed by: PHYSICIAN ASSISTANT

## 2022-08-09 PROCEDURE — 85025 COMPLETE CBC W/AUTO DIFF WBC: CPT

## 2022-08-09 PROCEDURE — 6360000002 HC RX W HCPCS: Performed by: PHYSICIAN ASSISTANT

## 2022-08-09 PROCEDURE — 97140 MANUAL THERAPY 1/> REGIONS: CPT | Performed by: OCCUPATIONAL THERAPIST

## 2022-08-09 PROCEDURE — 99284 EMERGENCY DEPT VISIT MOD MDM: CPT

## 2022-08-09 PROCEDURE — 96374 THER/PROPH/DIAG INJ IV PUSH: CPT

## 2022-08-09 RX ORDER — ONDANSETRON 2 MG/ML
4 INJECTION INTRAMUSCULAR; INTRAVENOUS ONCE
Status: COMPLETED | OUTPATIENT
Start: 2022-08-09 | End: 2022-08-09

## 2022-08-09 RX ORDER — KETOROLAC TROMETHAMINE 30 MG/ML
30 INJECTION, SOLUTION INTRAMUSCULAR; INTRAVENOUS ONCE
Status: COMPLETED | OUTPATIENT
Start: 2022-08-09 | End: 2022-08-09

## 2022-08-09 RX ORDER — SODIUM CHLORIDE 9 MG/ML
1000 INJECTION, SOLUTION INTRAVENOUS CONTINUOUS
Status: DISCONTINUED | OUTPATIENT
Start: 2022-08-09 | End: 2022-08-09 | Stop reason: HOSPADM

## 2022-08-09 RX ADMIN — SODIUM CHLORIDE 1000 ML: 9 INJECTION, SOLUTION INTRAVENOUS at 17:46

## 2022-08-09 RX ADMIN — ONDANSETRON HYDROCHLORIDE 4 MG: 2 SOLUTION INTRAMUSCULAR; INTRAVENOUS at 17:45

## 2022-08-09 RX ADMIN — KETOROLAC TROMETHAMINE 30 MG: 30 INJECTION, SOLUTION INTRAMUSCULAR at 17:45

## 2022-08-09 ASSESSMENT — PAIN SCALES - GENERAL
PAINLEVEL_OUTOF10: 8
PAINLEVEL_OUTOF10: 8

## 2022-08-09 ASSESSMENT — PAIN - FUNCTIONAL ASSESSMENT: PAIN_FUNCTIONAL_ASSESSMENT: 0-10

## 2022-08-09 ASSESSMENT — PAIN DESCRIPTION - ORIENTATION
ORIENTATION: LEFT;RIGHT
ORIENTATION: RIGHT;LEFT;LOWER

## 2022-08-09 ASSESSMENT — PAIN DESCRIPTION - ONSET: ONSET: SUDDEN

## 2022-08-09 ASSESSMENT — PAIN DESCRIPTION - FREQUENCY: FREQUENCY: CONTINUOUS

## 2022-08-09 ASSESSMENT — PAIN DESCRIPTION - PAIN TYPE: TYPE: ACUTE PAIN

## 2022-08-09 ASSESSMENT — PAIN DESCRIPTION - LOCATION
LOCATION: BACK
LOCATION: BACK

## 2022-08-09 ASSESSMENT — PAIN DESCRIPTION - DESCRIPTORS
DESCRIPTORS: JABBING;NAGGING;STABBING
DESCRIPTORS: PRESSURE

## 2022-08-09 NOTE — ED PROVIDER NOTES
[facility]  Department of Emergency Medicine   ED  Encounter Note  Admit Date/RoomTime: 2022  5:06 PM  ED Room:   NAME: Jackelyn Palmer  : 1995  MRN: [id number]     Chief Complaint:  Emesis (Started with abdominal pain earlier. Now both kidneys hurt and has constant urination.  ) and Abdominal Pain    HISTORY OF PRESENT ILLNESS        Jackelyn Palmer is a 33 y.o. male who presents to the ED with a complaint of abdominal pain and back pain. Patient states earlier today he had Shwetha Snuffer for lunch. Within hours had an upset stomach. Thought it was the Shwetha Snuffer. Did have 2 episodes of vomiting. He states now he just has a queasy stomach. But he is also got pain across the back and what feels like a kidney pain. States the pain to the right kidney seems like it is wrapping around the right side. In addition patient states ever since the vomiting he is urinating more than normal.  He denies any fevers. Only had those 2 episodes of vomiting. Denies any diarrhea. Denies any burning with urination, just the frequency. He rates the discomfort an 8 out of 10. No relief with Pepto-Bismol. ROS   Pertinent positives and negatives are stated within HPI, all other systems reviewed and are negative. Past Medical History:  has a past medical history of Cervical (neck) region somatic dysfunction, Cervical paraspinal muscle spasm, COVID, Dizziness and giddiness, and Tendon rupture, nontraumatic. Surgical History:  has a past surgical history that includes Transfer tooth extraction; Arm Surgery (Left, 2022); and Tendon repair. Social History:  reports that he has never smoked. His smokeless tobacco use includes chew. He reports current alcohol use. He reports that he does not use drugs. Family History: family history is not on file. Allergies: Pcn [penicillins]    PHYSICAL EXAM   Oxygen Saturation Interpretation: Normal on room air analysis. ED Triage Vitals   BP Temp Temp src Heart Rate Resp SpO2 Height Weight   08/09/22 1647 08/09/22 1647 -- 08/09/22 1647 08/09/22 1647 08/09/22 1647 08/09/22 1701 08/09/22 1701   133/83 98.5 °F (36.9 °C)  87 16 98 % 6' 4\" (1.93 m) 250 lb (113.4 kg)       General:  NAD. Alert and Oriented. Well-appearing. Skin:  Warm, dry. No rashes. Head:  Normocephalic. Atraumatic. Eyes:  EOMI. Conjunctiva normal.  ENT:  Oral mucosa moist.  Airway patent. Neck:  Supple. Normal ROM. Respiratory:  No respiratory distress. No labored breathing. Lungs clear without rales, rhonchi or wheezing. Cardiovascular:  Regular rate. No Murmur. No peripheral edema. Extremities warm and good color. Chest:  Abdomen:  Soft, nondistended. Normal bowel sounds. Nontender to palpation all 4 quadrants. Negative rebound, negative guarding. Rectal:  Gu: Bladder nontender and non distended. Positive right sided CVA tenderness. Pelvic:  Extremities:  Normal ROM. Nontender to palpation. Atraumatic. Back:  Normal ROM. Nontender to palpation. Neuro:  Alert and Oriented to person, place, time and situation. Normal LOC. Moves all extremities. Speech fluent. Psych:  Calm and Cooperative. Normal thought process. Normal judgement.     Lab / Imaging Results   (All laboratory and radiology results have been personally reviewed by myself)  Labs:  Results for orders placed or performed during the hospital encounter of 08/09/22   CBC with Auto Differential   Result Value Ref Range    WBC 6.2 4.5 - 11.5 E9/L    RBC 5.08 3.80 - 5.80 E12/L    Hemoglobin 15.4 12.5 - 16.5 g/dL    Hematocrit 42.6 37.0 - 54.0 %    MCV 83.9 80.0 - 99.9 fL    MCH 30.3 26.0 - 35.0 pg    MCHC 36.2 (H) 32.0 - 34.5 %    RDW 12.2 11.5 - 15.0 fL    Platelets 632 862 - 281 E9/L    MPV 10.4 7.0 - 12.0 fL    Neutrophils % 61.1 43.0 - 80.0 %    Immature Granulocytes % 0.3 0.0 - 5.0 %    Lymphocytes % 29.5 20.0 - 42.0 %    Monocytes % 7.2 2.0 - 12.0 %    Eosinophils % 1.3 0.0 - 6.0 %    Basophils % 0.6 0.0 - 2.0 %    Neutrophils Absolute 3.81 1.80 - 7.30 E9/L    Immature Granulocytes # 0.02 E9/L    Lymphocytes Absolute 1.84 1.50 - 4.00 E9/L    Monocytes Absolute 0.45 0.10 - 0.95 E9/L    Eosinophils Absolute 0.08 0.05 - 0.50 E9/L    Basophils Absolute 0.04 0.00 - 0.20 E9/L   Comprehensive Metabolic Panel w/ Reflex to MG   Result Value Ref Range    Sodium 138 132 - 146 mmol/L    Potassium reflex Magnesium 4.1 3.5 - 5.0 mmol/L    Chloride 102 98 - 107 mmol/L    CO2 26 22 - 29 mmol/L    Anion Gap 10 7 - 16 mmol/L    Glucose 79 74 - 99 mg/dL    BUN 16 6 - 20 mg/dL    Creatinine 1.3 (H) 0.7 - 1.2 mg/dL    GFR Non-African American >60 >=60 mL/min/1.73    GFR African American >60     Calcium 9.7 8.6 - 10.2 mg/dL    Total Protein 6.9 6.4 - 8.3 g/dL    Albumin 4.6 3.5 - 5.2 g/dL    Total Bilirubin 0.3 0.0 - 1.2 mg/dL    Alkaline Phosphatase 96 40 - 129 U/L    ALT 37 0 - 40 U/L    AST 22 0 - 39 U/L   Urinalysis with Microscopic   Result Value Ref Range    Color, UA Yellow Straw/Yellow    Clarity, UA Clear Clear    Glucose, Ur Negative Negative mg/dL    Bilirubin Urine Negative Negative    Ketones, Urine Negative Negative mg/dL    Specific Gravity, UA 1.020 1.005 - 1.030    Blood, Urine Negative Negative    pH, UA 6.0 5.0 - 9.0    Protein, UA Negative Negative mg/dL    Urobilinogen, Urine 0.2 <2.0 E.U./dL    Nitrite, Urine Negative Negative    Leukocyte Esterase, Urine Negative Negative    WBC, UA NONE 0 - 5 /HPF    RBC, UA NONE 0 - 2 /HPF    Bacteria, UA NONE SEEN None Seen /HPF   Lactic Acid   Result Value Ref Range    Lactic Acid 0.9 0.5 - 2.2 mmol/L     Imaging: All Radiology results interpreted by Radiologist unless otherwise noted.   No orders to display       ED Course / Medical Decision Making     Medications   0.9 % sodium chloride infusion (1,000 mLs IntraVENous New Bag 8/9/22 1746)   ketorolac (TORADOL) injection 30 mg (30 mg IntraVENous Given 8/9/22 1745)   ondansetron (ZOFRAN) injection 4 mg (4 mg IntraVENous Given 8/9/22 1745)        Re-examination:  8/9/22       Time: 1850    Patients condition has improved and is currently asymptomatic. Feels much better in just the short time that he is here. He really does think now it was the Guillermo Conch he ate. He has had no further vomiting here. Nausea is resolved. Consult(s):   None    Procedure(s):   None    MDM:   Patient treated as an acute gastritis with IV fluids and Zofran. Feels much better and is currently asymptomatic. Plan of Care/Counseling:  Don Alvarez reviewed today's visit with the patient in addition to providing specific details for the plan of care and counseling regarding the diagnosis and prognosis. Questions are answered at this time and are agreeable with the plan. ASSESSMENT     1. Acute superficial gastritis without hemorrhage      PLAN   Discharged home. Patient condition is good    New Medications     New Prescriptions    No medications on file     Electronically signed by SKYE Alvarez   DD: 8/9/22  **This report was transcribed using voice recognition software. Every effort was made to ensure accuracy; however, inadvertent computerized transcription errors may be present.   END OF ED PROVIDER NOTE       Don Alvarez  08/09/22 7108

## 2022-08-09 NOTE — PROGRESS NOTES
[address]  [phone]    Date:  2022    Initial Evaluation Date: 22                                Evaluating Therapist: Gillian Villegas OT     Patient Name:  Alexandra Clancy                  :  1995     Restrictions/Precautions:  follow flexor tendon protocol, low fall risk  Diagnosis:  S66.125A (ICD-10-CM) - Laceration of flexor muscle, fascia and tendon of left ring finger at wrist and hand level, initial encounter                                                          Date of Surgery/Injury: 22 (3 weeks post)     Insurance/Certification information:  [employer]  Plan of care signed (Y/N): N  Visit# / total visits:      Referring Practitioner:  Neil Spencer MD  Specific Practitioner Orders: eval and treat     Assessment of current deficits  [] Functional mobility             [x] ADLs           [x] Strength                  [] Cognition  [] Functional transfers           [x] IADLs          [] Safety Awareness  [x] Endurance  [x] Fine Motor Coordination    [] Balance      [] Vision/perception    [] Sensation     [] Gross Motor Coordination [x] ROM           [] Pain                        [x] Edema          [x] Scar Adhesion/Skin Integrity     OT PLAN OF CARE   OT POC based on physician orders, patient diagnosis and results of clinical assessment     Frequency/Duration 1-2x a week for 12visits  Patient Specific Goal: to be able to walk his dogs                             GOALS (Long term same as Short term):  1) Patient will demonstrate good understanding of home program (exercises/activities/diagnosis/prognosis/goals) with good accuracy. 2) Patient will demonstrate increased active/passive range of motion of their L hand to [facility] for ADL/IADL completion. 3) Patient will demonstrate increased /pinch strength of at least 10 / 5 pinch pounds of their L hand to increase participation in work related activities.    4) Patient and incision sites clean and dry     Assessment/Comments: Pt is making Good progress toward stated plan of care. Reviewed hep exercises. Good charlie with exercises. Tightness flexor of pip joint. Will lake night splint for stretching pip joint next session. L  Hand ROM  *active extension passtive flexion   AROM [x]         PROM[x] 7/29/22        2nd Digit MCP Extension/Flexion   0-45 H /* 0/85*       PIP Extension/Flexion   0*/100* 0/99*       DIP Extension/Flexion   0*/70-90* 0/80*        3rd Digit MCP Extension/Flexion   0-45 H /* 0/84*       PIP Extension/Flexion   0*/100* 0/100*       DIP Extension/Flexion   0*/70-90* 0/64*        4th Digit MCP Extension/Flexion   0-45 H /* 0/90*       PIP Extension/Flexion   0*/100* -35/94*       DIP Extension/Flexion   0*/70-90* -/40*        5th Digit MCP Extension/Flexion   0-45 H /* 0/103*       PIP Extension/Flexion   0*/100* -6/96*       DIP Extension/Flexion   0*/70-90* 0/98*        -Rehab Potential: Good    Billing:    TIME IN:  8:05am        TIME OUT:   9:00am        TOTAL TREATMENT TIME:    55 minutes      CODE   TODAY MINUTES TODAY UNITS     23925 Fluidotherapy         97105 Manual 15 1     48661 Therapeutic Ex       15495 Therapeutic Activity 30 2     21116 ADL/COMP Tech Train         36989 Neuromuscular Re-Ed       38340 OrthoManagementTraining           Modality:           Other: us  3.3/.8/                                           TOTAL   45 3                    Goals: Goals for pt can be seen on initial evaluation. Plan:   [x]  Continues Plan of care: Treatment covered based on POC and graduated to patient's progress. Pt education continues at each visit to obtain maximum benefits from skilled OT intervention.   []  Alter Plan of care:   []  Discharge:    Irving Rose OT/CARROLL, [address] [place] [address]

## 2022-08-11 ENCOUNTER — TREATMENT (OUTPATIENT)
Dept: OCCUPATIONAL THERAPY | Age: 27
End: 2022-08-11
Payer: COMMERCIAL

## 2022-08-11 DIAGNOSIS — S66.125A LACERATION OF FLEXOR MUSCLE, FASCIA AND TENDON OF LEFT RING FINGER AT WRIST AND HAND LEVEL, INITIAL ENCOUNTER: Primary | ICD-10-CM

## 2022-08-11 PROCEDURE — 97140 MANUAL THERAPY 1/> REGIONS: CPT | Performed by: OCCUPATIONAL THERAPIST

## 2022-08-11 PROCEDURE — 97530 THERAPEUTIC ACTIVITIES: CPT | Performed by: OCCUPATIONAL THERAPIST

## 2022-08-11 NOTE — PROGRESS NOTES
[address]  [phone]    Date:  2022    Initial Evaluation Date: 22                                Evaluating Therapist: Santiago Mcardle, OT     Patient Name:  Al Naylor                  :  1995     Restrictions/Precautions:  follow flexor tendon protocol, low fall risk  Diagnosis:  S66.125A (ICD-10-CM) - Laceration of flexor muscle, fascia and tendon of left ring finger at wrist and hand level, initial encounter                                                          Date of Surgery/Injury: 22 (3 weeks post)     Insurance/Certification information:  [employer]  Plan of care signed (Y/N): N  Visit# / total visits:      Referring Practitioner:  Asiya Musa MD  Specific Practitioner Orders: eval and treat     Assessment of current deficits  [] Functional mobility             [x] ADLs           [x] Strength                  [] Cognition  [] Functional transfers           [x] IADLs          [] Safety Awareness  [x] Endurance  [x] Fine Motor Coordination    [] Balance      [] Vision/perception    [] Sensation     [] Gross Motor Coordination [x] ROM           [] Pain                        [x] Edema          [x] Scar Adhesion/Skin Integrity     OT PLAN OF CARE   OT POC based on physician orders, patient diagnosis and results of clinical assessment     Frequency/Duration 1-2x a week for 12visits  Patient Specific Goal: to be able to walk his dogs                             GOALS (Long term same as Short term):  1) Patient will demonstrate good understanding of home program (exercises/activities/diagnosis/prognosis/goals) with good accuracy. 2) Patient will demonstrate increased active/passive range of motion of their L hand to [facility] for ADL/IADL completion. 3) Patient will demonstrate increased /pinch strength of at least 10 / 5 pinch pounds of their L hand to increase participation in work related activities.    4) keeping sponge and incision sites clean and dry     Assessment/Comments: Pt is making Good progress toward stated plan of care. Doing really good with good pull on profundus tendon. Will progress per protocol. L  Hand ROM  *active extension passtive flexion   AROM [x]         PROM[x] 7/29/22        2nd Digit MCP Extension/Flexion   0-45 H /* 0/85*       PIP Extension/Flexion   0*/100* 0/99*       DIP Extension/Flexion   0*/70-90* 0/80*        3rd Digit MCP Extension/Flexion   0-45 H /* 0/84*       PIP Extension/Flexion   0*/100* 0/100*       DIP Extension/Flexion   0*/70-90* 0/64*        4th Digit MCP Extension/Flexion   0-45 H /* 0/90*       PIP Extension/Flexion   0*/100* -35/94*       DIP Extension/Flexion   0*/70-90* -/40*        5th Digit MCP Extension/Flexion   0-45 H /* 0/103*       PIP Extension/Flexion   0*/100* -6/96*       DIP Extension/Flexion   0*/70-90* 0/98*        -Rehab Potential: Good    Billing:    TIME IN:  8:05am        TIME OUT:   9:00am        TOTAL TREATMENT TIME:    55 minutes      CODE   TODAY MINUTES TODAY UNITS     62822 Fluidotherapy         88771 Manual 15 1     70840 Therapeutic Ex       61113 Therapeutic Activity 30 2     92577 ADL/COMP Tech Train         46799 Neuromuscular Re-Ed       21413 OrthoManagementTraining           Modality:           Other: us  3.3/.8/                                           TOTAL   45 3                    Goals: Goals for pt can be seen on initial evaluation. Plan:   [x]  Continues Plan of care: Treatment covered based on POC and graduated to patient's progress. Pt education continues at each visit to obtain maximum benefits from skilled OT intervention.   []  Alter Plan of care:   []  Discharge:    Merari Franklin OT/L, [address] [place] [address]

## 2022-08-18 ENCOUNTER — TREATMENT (OUTPATIENT)
Dept: OCCUPATIONAL THERAPY | Age: 27
End: 2022-08-18
Payer: COMMERCIAL

## 2022-08-18 DIAGNOSIS — S66.125A LACERATION OF FLEXOR MUSCLE, FASCIA AND TENDON OF LEFT RING FINGER AT WRIST AND HAND LEVEL, INITIAL ENCOUNTER: Primary | ICD-10-CM

## 2022-08-18 PROCEDURE — 97530 THERAPEUTIC ACTIVITIES: CPT | Performed by: OCCUPATIONAL THERAPIST

## 2022-08-18 PROCEDURE — 97140 MANUAL THERAPY 1/> REGIONS: CPT | Performed by: OCCUPATIONAL THERAPIST

## 2022-08-18 NOTE — PROGRESS NOTES
[address]  [phone]    Date:  2022    Initial Evaluation Date: 22                                Evaluating Therapist: Gillian Villegas OT     Patient Name:  Alexandra Clancy                  :  1995     Restrictions/Precautions:  follow flexor tendon protocol, low fall risk  Diagnosis:  S66.125A (ICD-10-CM) - Laceration of flexor muscle, fascia and tendon of left ring finger at wrist and hand level, initial encounter                                                          Date of Surgery/Injury: 22 (3 weeks post)     Insurance/Certification information:  [employer]  Plan of care signed (Y/N): N  Visit# / total visits:      Referring Practitioner:  Neil Spencer MD  Specific Practitioner Orders: eval and treat     Assessment of current deficits  [] Functional mobility             [x] ADLs           [x] Strength                  [] Cognition  [] Functional transfers           [x] IADLs          [] Safety Awareness  [x] Endurance  [x] Fine Motor Coordination    [] Balance      [] Vision/perception    [] Sensation     [] Gross Motor Coordination [x] ROM           [] Pain                        [x] Edema          [x] Scar Adhesion/Skin Integrity     OT PLAN OF CARE   OT POC based on physician orders, patient diagnosis and results of clinical assessment     Frequency/Duration 1-2x a week for 12visits  Patient Specific Goal: to be able to walk his dogs                             GOALS (Long term same as Short term):  1) Patient will demonstrate good understanding of home program (exercises/activities/diagnosis/prognosis/goals) with good accuracy. 2) Patient will demonstrate increased active/passive range of motion of their L hand to [facility] for ADL/IADL completion. 3) Patient will demonstrate increased /pinch strength of at least 10 / 5 pinch pounds of their L hand to increase participation in work related activities.    4) Patient to report decreased pain in their affected L upper extremity from 6/10 to 2/10 or less with AROM and PROM exercies. 5) Increase in fine motor function as evidenced by decreased time to complete 9-hole peg test and/or MRMT test by at least 5-10 seconds to increase participation and independence with dressing tasks-buttons, zippers. 6) Patient to report 100% compliance with their splint wear, care, and precautions if needed. 7) Patient will be knowledgeable of edema control techniques as evident with decreases from moderate to mild/none. 8) Patient will demonstrate a non-tender/non-adherent scar. 9) Patient will decrease QuickDASH score to 25% or less for increased participation in daily functional activities     TODAY'S PROGRESS NOTE:    Pain Level: 0-2 pain reported    Subjective: \"ready for this button to be removed today. My finger is some straighter from the spint  \"     Objective:  Updated POC to be completed by 10th visit. INTERVENTION: COMPLETED: SPECIFICS/COMMENTS:   Modality:     MHP  x 5 minutes during wound care to decrease stiffness and increase soft tissue elasticity   us x 3.3/.8/50%  8 min   AROM:     fingers X  X  X    x Active flex/ext all digits. +finger scratches with other hand 20x   +light small peg activity , aligning 3 rows of pegs. +towel grasp 20x   wrist x =active flex/ext with fingers ext and flexed   AAROM:     Tendodesis wrist and fingers  Tenodesis wrist and finger movement out of splint with therapist  . 15 x   PROM/Stretching:     fingers X  x Dip, pip,composite passive flexion  +with mcp flexion light stretichng of pip joint        Scar Mass/Edema Control:     Soft tissue/scar massage   scar /soft tissue massage. 10 min        Strengthening:               Other:     Splint adjustment x Increased dip/pip ext with splint.   -added blue sponge for increased extension at the PIP joint. Cut to hand bases to wear at night only for pip stretching.     Wound care x -saline with q-tip - educated pt on skin integrity and keeping sponge and incision sites clean and dry     Assessment/Comments: Pt is making Good progress toward stated plan of care. Some improved pip ext is noted from night splint wear. Good progress is noted. Will progress with sponge removal.       L  Hand ROM  *active extension passtive flexion   AROM [x]         PROM[x] 7/29/22        2nd Digit MCP Extension/Flexion   0-45 H /* 0/85*       PIP Extension/Flexion   0*/100* 0/99*       DIP Extension/Flexion   0*/70-90* 0/80*        3rd Digit MCP Extension/Flexion   0-45 H /* 0/84*       PIP Extension/Flexion   0*/100* 0/100*       DIP Extension/Flexion   0*/70-90* 0/64*        4th Digit MCP Extension/Flexion   0-45 H /* 0/90*       PIP Extension/Flexion   0*/100* -35/94*       DIP Extension/Flexion   0*/70-90* -/40*        5th Digit MCP Extension/Flexion   0-45 H /* 0/103*       PIP Extension/Flexion   0*/100* -6/96*       DIP Extension/Flexion   0*/70-90* 0/98*        -Rehab Potential: Good    Billing:    TIME IN:  8:05am        TIME OUT:   9:00am        TOTAL TREATMENT TIME:    55 minutes      CODE   TODAY MINUTES TODAY UNITS     73045 Fluidotherapy         85954 Manual 15 1     80772 Therapeutic Ex       72818 Therapeutic Activity 30 2     71305 ADL/COMP Tech Train         46179 Neuromuscular Re-Ed       76552 OrthoManagementTraining           Modality:           Other: us  3.3/.8/                                           TOTAL   45 3                    Goals: Goals for pt can be seen on initial evaluation. Plan:   [x]  Continues Plan of care: Treatment covered based on POC and graduated to patient's progress. Pt education continues at each visit to obtain maximum benefits from skilled OT intervention.   []  Alter Plan of care:   []  Discharge:    Darlin Ackerman OT/CARROLL, [address] [place] [address]

## 2022-08-25 ENCOUNTER — TREATMENT (OUTPATIENT)
Dept: OCCUPATIONAL THERAPY | Age: 27
End: 2022-08-25
Payer: COMMERCIAL

## 2022-08-25 DIAGNOSIS — S66.125A LACERATION OF FLEXOR MUSCLE, FASCIA AND TENDON OF LEFT RING FINGER AT WRIST AND HAND LEVEL, INITIAL ENCOUNTER: Primary | ICD-10-CM

## 2022-08-25 PROCEDURE — 97140 MANUAL THERAPY 1/> REGIONS: CPT | Performed by: OCCUPATIONAL THERAPIST

## 2022-08-25 PROCEDURE — 97022 WHIRLPOOL THERAPY: CPT | Performed by: OCCUPATIONAL THERAPIST

## 2022-08-25 PROCEDURE — 97110 THERAPEUTIC EXERCISES: CPT | Performed by: OCCUPATIONAL THERAPIST

## 2022-08-25 NOTE — PROGRESS NOTES
[address]  [phone]    Date:  2022    Initial Evaluation Date: 22                                Evaluating Therapist: Sobia Marsh OT     Patient Name:  Alok Peña                  :  1995     Restrictions/Precautions:  follow flexor tendon protocol, low fall risk  Diagnosis:  S66.125A (ICD-10-CM) - Laceration of flexor muscle, fascia and tendon of left ring finger at wrist and hand level, initial encounter                                                          Date of Surgery/Injury: 22 (3 weeks post)     Insurance/Certification information:  [employer]  Plan of care signed (Y/N): N  Visit# / total visits:      Referring Practitioner:  Stephen Glez MD  Specific Practitioner Orders: eval and treat     Assessment of current deficits  [] Functional mobility             [x] ADLs           [x] Strength                  [] Cognition  [] Functional transfers           [x] IADLs          [] Safety Awareness  [x] Endurance  [x] Fine Motor Coordination    [] Balance      [] Vision/perception    [] Sensation     [] Gross Motor Coordination [x] ROM           [] Pain                        [x] Edema          [x] Scar Adhesion/Skin Integrity     OT PLAN OF CARE   OT POC based on physician orders, patient diagnosis and results of clinical assessment     Frequency/Duration 1-2x a week for 12visits  Patient Specific Goal: to be able to walk his dogs                             GOALS (Long term same as Short term):  1) Patient will demonstrate good understanding of home program (exercises/activities/diagnosis/prognosis/goals) with good accuracy. 2) Patient will demonstrate increased active/passive range of motion of their L hand to [facility] for ADL/IADL completion. 3) Patient will demonstrate increased /pinch strength of at least 10 / 5 pinch pounds of their L hand to increase participation in work related activities.    4) progress and healing. Will reassess next visit. L  Hand ROM  *active extension passtive flexion   AROM [x]         PROM[x] 7/29/22        2nd Digit MCP Extension/Flexion   0-45 H /* 0/85*       PIP Extension/Flexion   0*/100* 0/99*       DIP Extension/Flexion   0*/70-90* 0/80*        3rd Digit MCP Extension/Flexion   0-45 H /* 0/84*       PIP Extension/Flexion   0*/100* 0/100*       DIP Extension/Flexion   0*/70-90* 0/64*        4th Digit MCP Extension/Flexion   0-45 H /* 0/90*       PIP Extension/Flexion   0*/100* -35/94*       DIP Extension/Flexion   0*/70-90* -/40*        5th Digit MCP Extension/Flexion   0-45 H /* 0/103*       PIP Extension/Flexion   0*/100* -6/96*       DIP Extension/Flexion   0*/70-90* 0/98*        -Rehab Potential: Good    Billing:    TIME IN:  8:05am        TIME OUT:   9:00am        TOTAL TREATMENT TIME:    55 minutes      CODE   TODAY MINUTES TODAY UNITS     80809 Fluidotherapy  10 1      02436 Manual 15 1     74599 Therapeutic Ex       41084 Therapeutic Activity 30 2     66377 ADL/COMP Tech Train         27691 Neuromuscular Re-Ed       14960 OrthoManagementTraining           Modality:           Other: us  3.3/.8/                                           TOTAL   55 4                    Goals: Goals for pt can be seen on initial evaluation. Plan:   [x]  Continues Plan of care: Treatment covered based on POC and graduated to patient's progress. Pt education continues at each visit to obtain maximum benefits from skilled OT intervention.   []  Alter Plan of care:   []  Discharge:    Catalino Higgins OT/L, [address] [place] [address]

## 2022-08-30 ENCOUNTER — TREATMENT (OUTPATIENT)
Dept: OCCUPATIONAL THERAPY | Age: 27
End: 2022-08-30
Payer: COMMERCIAL

## 2022-08-30 DIAGNOSIS — S66.125A LACERATION OF FLEXOR MUSCLE, FASCIA AND TENDON OF LEFT RING FINGER AT WRIST AND HAND LEVEL, INITIAL ENCOUNTER: Primary | ICD-10-CM

## 2022-08-30 PROCEDURE — 97110 THERAPEUTIC EXERCISES: CPT | Performed by: OCCUPATIONAL THERAPIST

## 2022-08-30 PROCEDURE — 97530 THERAPEUTIC ACTIVITIES: CPT | Performed by: OCCUPATIONAL THERAPIST

## 2022-08-30 PROCEDURE — 97140 MANUAL THERAPY 1/> REGIONS: CPT | Performed by: OCCUPATIONAL THERAPIST

## 2022-08-30 NOTE — PROGRESS NOTES
dip/pip            x Blocking exercises dip and pip exercises. PROM/Stretching:     fingers X  X    x Dip, pip,composite passive flexion  +with mcp flexion light stretichng of pip joint  Pip and dip ext stretches        Scar Mass/Edema Control:     Soft tissue/scar massage   scar /soft tissue massage. 10 min        Strengthening:               Other:     Splint adjustment x LMB provided to help with finger ext. To wear 2x daily.   - Issued large extension tube for night wear to increase extension. Assessment/Comments: Pt is making Good progress toward stated plan of care. Pt tolerated session well with extension tube issued for night wear and to continue to wear LMB throughout the day. Completed place and holds with good FDP hold with isolated blocked however with full excursion FDP demonstrates adherence. Continue to focus on place and holds and functional use of the hand with scar tissue management.     -Rehab Potential: Good  Billing:    TIME IN: 1:00 pm        TIME OUT: 2:00 pm       TOTAL TREATMENT TIME:      CODE   TODAY MINUTES TODAY UNITS     86426 Fluidotherapy       31425 Manual 30 2     11614 Therapeutic Ex 20 1     14577 Therapeutic Activity 10 1     38269 ADL/COMP Tech Train         64736 Neuromuscular Re-Ed       95104 OrthoManagementTraining           Modality:           Other: us  3.3/.8/                                           TOTAL   60 4                    Goals: Goals for pt can be seen on initial evaluation. Plan:   [x]  Continues Plan of care: Treatment covered based on POC and graduated to patient's progress. Pt education continues at each visit to obtain maximum benefits from skilled OT intervention.   []  Alter Plan of care:   []  Discharge:    Mercy Frias Miguel 88, OTR/L #[id number]

## 2022-09-01 ENCOUNTER — TREATMENT (OUTPATIENT)
Dept: OCCUPATIONAL THERAPY | Age: 27
End: 2022-09-01
Payer: COMMERCIAL

## 2022-09-01 DIAGNOSIS — S66.125A LACERATION OF FLEXOR MUSCLE, FASCIA AND TENDON OF LEFT RING FINGER AT WRIST AND HAND LEVEL, INITIAL ENCOUNTER: Primary | ICD-10-CM

## 2022-09-01 PROCEDURE — 97022 WHIRLPOOL THERAPY: CPT | Performed by: OCCUPATIONAL THERAPIST

## 2022-09-01 PROCEDURE — 97140 MANUAL THERAPY 1/> REGIONS: CPT | Performed by: OCCUPATIONAL THERAPIST

## 2022-09-01 PROCEDURE — 97110 THERAPEUTIC EXERCISES: CPT | Performed by: OCCUPATIONAL THERAPIST

## 2022-09-01 NOTE — PROGRESS NOTES
[address] [facility]  [phone]    Date:  2022    Initial Evaluation Date: 22                                Evaluating Therapist: Cecilia Cedillo OT     Patient Name:  Tristin Redding                  :  1995     Restrictions/Precautions:  follow flexor tendon protocol, low fall risk  Diagnosis:  S66.125A (ICD-10-CM) - Laceration of flexor muscle, fascia and tendon of left ring finger at wrist and hand level, initial encounter                                                          Date of Surgery/Injury: 22 (8 weeks post)     Insurance/Certification information:  [employer]  Plan of care signed (Y/N): N  Visit# / total visits: 10/ 12     Referring Practitioner:  Obed Castanon MD  Specific Practitioner Orders: eval and treat     Assessment of current deficits  [] Functional mobility             [x] ADLs           [x] Strength                  [] Cognition  [] Functional transfers           [x] IADLs          [] Safety Awareness  [x] Endurance  [x] Fine Motor Coordination    [] Balance      [] Vision/perception    [] Sensation     [] Gross Motor Coordination [x] ROM           [] Pain                        [x] Edema          [x] Scar Adhesion/Skin Integrity     OT PLAN OF CARE   OT POC based on physician orders, patient diagnosis and results of clinical assessment     Frequency/Duration 1-2x a week for 12visits  Patient Specific Goal: to be able to walk his dogs                             GOALS (Long term same as Short term):  1) Patient will demonstrate good understanding of home program (exercises/activities/diagnosis/prognosis/goals) with good accuracy. GOAL PROGRESSING  2) Patient will demonstrate increased active/passive range of motion of their L hand to [facility] for ADL/IADL completion.  GOAL PROGRESSING SEE MEASUREMENTS BELOW  3) Patient will demonstrate increased /pinch strength of at least 10 / 5 pinch pounds of their L hand to increase participation in work related activities. MEASUREMENTS TAKEN TODAY  4) Patient to report decreased pain in their affected L upper extremity from 6/10 to 2/10 or less with AROM and PROM exercies. GOAL MET  5) Increase in fine motor function as evidenced by decreased time to complete 9-hole peg test and/or MRMT test by at least 5-10 seconds to increase participation and independence with dressing tasks-buttons, zippers. GOAL MET  6) Patient to report 100% compliance with their splint wear, care, and precautions if needed. GOAL MET  7) Patient will be knowledgeable of edema control techniques as evident with decreases from moderate to mild/none. GOAL MET  8) Patient will demonstrate a non-tender/non-adherent scar. GOAL PROGRESSING  9) Patient will decrease QuickDASH score to 25% or less for increased participation in daily functional activities GOAL PROGRESSING     TODAY'S PROGRESS NOTE:    Pain Level: soreness in the finger and palm    Subjective: Pt reports increased functional use of my left hand. Objective:  Updated POC to be completed by 10th visit. INTERVENTION: COMPLETED: SPECIFICS/COMMENTS:   Modality:     fluido  10 min to increase tissue mobility   Paraffin + MHP x 15 mins with PROM stretching for increasing soft tissue mobility and active range of motion. us x 3.3/.8/50%  8 min to volar MF scar site for decreasing scar tissue to be followed with AROM tasks. AROM:     fingers X   Active flex/ext all digits.  - Extension place and holds at the PIP,jt blocking PIP and DIP with flexion place and holds at end range 2 sets x10.   - small boading balls clockwise and counter clockwise x20.   +putty roll for ext/picking out pegs   +light small peg activity , aligning 3 rows of pegs. +scratching of other digits for dip flexion pull  - towel scrunches x10  - in hand manipulation working on full grasp and tucking of MF DIP to keep change in the palm during saul task.    - flipping cards using MF with increasing flexion of the DIP joint during completion. wrist  active flex/ext with fingers ext and flexed   AAROM:       Blocking dip/pip            x Blocking exercises dip and pip exercises. PROM/Stretching:     fingers X  X    x Dip, pip,composite passive flexion  +with mcp flexion light stretichng of pip joint  Pip and dip ext stretches        Scar Mass/Edema Control:     Soft tissue/scar massage   scar /soft tissue massage. 10 min        Strengthening:     Yellow digiflex x 3 min   Red flexbar x 15x2  bend and twist   Putty and pegs x 10 min roll and remove pegs. Other:     Splint adjustment x LMB provided to help with finger ext. To wear 2x daily.   - Issued large extension tube for night wear to increase extension. Assessment/Comments: Pt is making Good progress toward stated plan of care. Reassessment is as follows.    Dynamometer (setting 2): 9/1/24                            Left: 70#                                                Right: 100#                    Pinch Meter:              Lateral: Left= 27#, Right= 26#              Palmar 3 point: Left= 16#, Right= 20#  9 Hole Peg Test:              Left: WNL              Right: WNL       L  Hand ROM  *active extension passtive flexion    AROM [x]         PROM[x] 7/29/22 9/1/22           2nd Digit MCP Extension/Flexion   0-45 H /* 0/85* wnl          PIP Extension/Flexion   0*/100* 0/99*  wnl         DIP Extension/Flexion   0*/70-90* 0/80* wnl           3rd Digit MCP Extension/Flexion   0-45 H /* 0/84* wnl          PIP Extension/Flexion   0*/100* 0/100* wnl          DIP Extension/Flexion   0*/70-90* 0/64* wnl           4th Digit MCP Extension/Flexion   0-45 H /* 0/90* 0/90          PIP Extension/Flexion   0*/100* -35/94*  -14/95         DIP Extension/Flexion   0*/70-90* -/40*  -10/45          5th Digit MCP Extension/Flexion   0-45 H /* 0/103* wnl          PIP Extension/Flexion   0*/100* -6/96* wnl DIP Extension/Flexion   0*/70-90* 0/98*  wnl        -Rehab Potential: Good  Billing:    TIME IN: 1:00 pm        TIME OUT: 2:00 pm       TOTAL TREATMENT TIME:      CODE   TODAY MINUTES TODAY UNITS     41279 Fluidotherapy 10 1     53952 Manual 15 1     79797 Therapeutic Ex 30 2     93741 Therapeutic Activity       89021 ADL/COMP Tech Train         78286 Neuromuscular Re-Ed       76017 OrthoManagementTraining           Modality:           Other: us  3.3/.8/                                           TOTAL   55 4                    Goals: Goals for pt can be seen on initial evaluation. Plan:   [x]  Continues Plan of care: Treatment covered based on POC and graduated to patient's progress. Pt education continues at each visit to obtain maximum benefits from skilled OT intervention.   []  Alter Plan of care:   []  Discharge:    Dona Suh OT/L, [address] [place] [address]

## 2022-09-06 ENCOUNTER — TREATMENT (OUTPATIENT)
Dept: OCCUPATIONAL THERAPY | Age: 27
End: 2022-09-06
Payer: COMMERCIAL

## 2022-09-06 DIAGNOSIS — S66.125A LACERATION OF FLEXOR MUSCLE, FASCIA AND TENDON OF LEFT RING FINGER AT WRIST AND HAND LEVEL, INITIAL ENCOUNTER: Primary | ICD-10-CM

## 2022-09-06 PROCEDURE — 97110 THERAPEUTIC EXERCISES: CPT | Performed by: OCCUPATIONAL THERAPIST

## 2022-09-06 PROCEDURE — 97035 APP MDLTY 1+ULTRASOUND EA 15: CPT | Performed by: OCCUPATIONAL THERAPIST

## 2022-09-06 PROCEDURE — 97018 PARAFFIN BATH THERAPY: CPT | Performed by: OCCUPATIONAL THERAPIST

## 2022-09-06 NOTE — PROGRESS NOTES
[address]  [phone]    Date:  2022    Initial Evaluation Date: 22                                Evaluating Therapist: Janett Guevara OT     Patient Name:  Lynnette Burks                  :  1995     Restrictions/Precautions:  follow flexor tendon protocol, low fall risk  Diagnosis:  S66.125A (ICD-10-CM) - Laceration of flexor muscle, fascia and tendon of left ring finger at wrist and hand level, initial encounter                                                          Date of Surgery/Injury: 22 (8 weeks post)     Insurance/Certification information:  [employer]  Plan of care signed (Y/N): N  Visit# / total visits:      Referring Practitioner:  Eliud Irby MD  Specific Practitioner Orders: eval and treat     Assessment of current deficits  [] Functional mobility             [x] ADLs           [x] Strength                  [] Cognition  [] Functional transfers           [x] IADLs          [] Safety Awareness  [x] Endurance  [x] Fine Motor Coordination    [] Balance      [] Vision/perception    [] Sensation     [] Gross Motor Coordination [x] ROM           [] Pain                        [x] Edema          [x] Scar Adhesion/Skin Integrity     OT PLAN OF CARE   OT POC based on physician orders, patient diagnosis and results of clinical assessment     Frequency/Duration 1-2x a week for 12visits  Patient Specific Goal: to be able to walk his dogs                             GOALS (Long term same as Short term):  1) Patient will demonstrate good understanding of home program (exercises/activities/diagnosis/prognosis/goals) with good accuracy. GOAL PROGRESSING, Pt demonstrates good carry over of exercises with continued exercises for scar tissue management. 2) Patient will demonstrate increased active/passive range of motion of their L hand to [facility] for ADL/IADL completion.  GOAL PROGRESSING SEE MEASUREMENTS BELOW  3) Patient will demonstrate increased /pinch strength of at least 10 / 5 pinch pounds of their L hand to increase participation in work related activities. MEASUREMENTS TAKEN TODAY  4) Patient to report decreased pain in their affected L upper extremity from 6/10 to 2/10 or less with AROM and PROM exercies. GOAL MET  5) Increase in fine motor function as evidenced by decreased time to complete 9-hole peg test and/or MRMT test by at least 5-10 seconds to increase participation and independence with dressing tasks-buttons, zippers. GOAL MET  6) Patient to report 100% compliance with their splint wear, care, and precautions if needed. GOAL MET  7) Patient will be knowledgeable of edema control techniques as evident with decreases from moderate to mild/none. GOAL MET  8) Patient will demonstrate a non-tender/non-adherent scar. GOAL PROGRESSING, continues to hinder pt's ability to bend the DIP of the affected digit. 9) Patient will decrease QuickDASH score to 25% or less for increased participation in daily functional activities GOAL PROGRESSING    Pain Level:  no pain reported    Subjective: Pt reportsonly have difficulty with pulling the tip of the affected digit in when making a fist.     Objective:  Updated POC to be completed by 10th visit. INTERVENTION: COMPLETED: SPECIFICS/COMMENTS:   Modality:     fluido  10 min to increase tissue mobility   Paraffin + MHP x 15 mins with PROM stretching for increasing soft tissue mobility and active range of motion. us x 3.3/.8/50%  8 min to volar MF scar site for decreasing scar tissue to be followed with AROM tasks. AROM:     fingers X   Active flex/ext all digits.  - Extension place and holds at the PIP,jt blocking PIP and DIP with flexion place and holds at end range 2 sets x10.   - small boading balls clockwise and counter clockwise x20.   +putty roll for ext/picking out pegs   +light small peg activity , aligning 3 rows of pegs.    +scratching of other digits for dip flexion pull  - pom pom grasp with DIP of the ring finger to pull into a fist x15  - towel scrunches x10  - in hand manipulation working on full grasp and tucking of MF DIP to keep change in the palm during saul task. - flipping cards using MF with increasing flexion of the DIP joint during completion. wrist  active flex/ext with fingers ext and flexed   AAROM:       Blocking dip/pip            x Blocking exercises dip and pip exercises. PROM/Stretching:     fingers X  X    x Dip, pip,composite passive flexion  +with mcp flexion light stretichng of pip joint  Pip and dip ext stretches        Scar Mass/Edema Control:     Soft tissue/scar massage   scar /soft tissue massage. 10 min        Strengthening:     Yellow digiflex  3 min   Red flexbar  15x2  bend and twist   L digits x - using moderate resistance putty with tips of fingers to dig in and pull back to assist with increasing DIP flexion/decrease adhesion on the FDP.   - Moderate putty to complete x15 grasp, hook. Putty and pegs  10 min roll and remove pegs. Other:     Splint adjustment x LMB provided to help with finger ext. To wear 2x daily.   - Issued large extension tube for night wear to increase extension. Assessment/Comments: Pt is making Good progress toward stated plan of care. Pt tolerated session well however continued to demonstrate adhesion on the FDP of the ring finger. Pt did demonstrate improvement with motion at the end of the session after place and holds in different positions, US and resistance pull on the tendon.  Will continue with 4 additional session to work on decreasing scar tissue and increasing AROM of the DIP of the ring finger.     -Rehab Potential: Good  Billing:    TIME IN: 1:00 pm        TIME OUT: 2:00 pm           CODE   TODAY MINUTES TODAY UNITS     42410 Fluidotherapy       15213 Manual       67737 Therapeutic Ex 42 2     27916 Therapeutic Activity       06616 ADL/COMP Tech Train         60384 Neuromuscular Re-Ed       38054 OrthoManagementTraining           Modality:           Other: us  3.3/.8/ 8 1        Paraffin 10   1                             TOTAL   60 4                    Goals: Goals for pt can be seen on initial evaluation. Plan:   [x]  Continues Plan of care: 1x/week for 4 additional sessions for scar tissue management and increasing AROM. Treatment covered based on POC and graduated to patient's progress. Pt education continues at each visit to obtain maximum benefits from skilled OT intervention. []  Alter Plan of care:   []  Discharge:    By co-signing this document, I demonstrate that I have reviewed the Plan of Care established for skilled therapy services and certify that the services are required and that they will be provided while the patient is under my care. If I have any comments or revisions to make to the POC, I will notify the evaluating therapist at the earliest convenience.       [facility] Miguel 87, OTR/L #[id number]

## 2022-09-09 ENCOUNTER — TREATMENT (OUTPATIENT)
Dept: OCCUPATIONAL THERAPY | Age: 27
End: 2022-09-09
Payer: COMMERCIAL

## 2022-09-09 DIAGNOSIS — S66.125A LACERATION OF FLEXOR MUSCLE, FASCIA AND TENDON OF LEFT RING FINGER AT WRIST AND HAND LEVEL, INITIAL ENCOUNTER: Primary | ICD-10-CM

## 2022-09-09 PROCEDURE — 97035 APP MDLTY 1+ULTRASOUND EA 15: CPT | Performed by: OCCUPATIONAL THERAPIST

## 2022-09-09 PROCEDURE — 97110 THERAPEUTIC EXERCISES: CPT | Performed by: OCCUPATIONAL THERAPIST

## 2022-09-09 PROCEDURE — 97018 PARAFFIN BATH THERAPY: CPT | Performed by: OCCUPATIONAL THERAPIST

## 2022-09-09 NOTE — PROGRESS NOTES
[address]  [phone]    Date:  2022    Initial Evaluation Date: 22                                Evaluating Therapist: Sobia Marsh OT     Patient Name:  Alok Peña                  :  1995     Restrictions/Precautions:  follow flexor tendon protocol, low fall risk  Diagnosis:  S66.125A (ICD-10-CM) - Laceration of flexor muscle, fascia and tendon of left ring finger at wrist and hand level, initial encounter                                                          Date of Surgery/Injury: 22 (8 weeks post)     Insurance/Certification information:  [employer]  Plan of care signed (Y/N): N  Visit# / total visits:      Referring Practitioner:  Stephen Glez MD  Specific Practitioner Orders: eval and treat     Assessment of current deficits  [] Functional mobility             [x] ADLs           [x] Strength                  [] Cognition  [] Functional transfers           [x] IADLs          [] Safety Awareness  [x] Endurance  [x] Fine Motor Coordination    [] Balance      [] Vision/perception    [] Sensation     [] Gross Motor Coordination [x] ROM           [] Pain                        [x] Edema          [x] Scar Adhesion/Skin Integrity     OT PLAN OF CARE   OT POC based on physician orders, patient diagnosis and results of clinical assessment     Frequency/Duration 1-2x a week for 12visits  Patient Specific Goal: to be able to walk his dogs                             GOALS (Long term same as Short term):  1) Patient will demonstrate good understanding of home program (exercises/activities/diagnosis/prognosis/goals) with good accuracy. GOAL PROGRESSING, Pt demonstrates good carry over of exercises with continued exercises for scar tissue management. 2) Patient will demonstrate increased active/passive range of motion of their L hand to [facility] for ADL/IADL completion.  GOAL PROGRESSING SEE MEASUREMENTS BELOW  3) Patient will demonstrate increased /pinch strength of at least 10 / 5 pinch pounds of their L hand to increase participation in work related activities. MEASUREMENTS TAKEN TODAY  4) Patient to report decreased pain in their affected L upper extremity from 6/10 to 2/10 or less with AROM and PROM exercies. GOAL MET  5) Increase in fine motor function as evidenced by decreased time to complete 9-hole peg test and/or MRMT test by at least 5-10 seconds to increase participation and independence with dressing tasks-buttons, zippers. GOAL MET  6) Patient to report 100% compliance with their splint wear, care, and precautions if needed. GOAL MET  7) Patient will be knowledgeable of edema control techniques as evident with decreases from moderate to mild/none. GOAL MET  8) Patient will demonstrate a non-tender/non-adherent scar. GOAL PROGRESSING, continues to hinder pt's ability to bend the DIP of the affected digit. 9) Patient will decrease QuickDASH score to 25% or less for increased participation in daily functional activities GOAL PROGRESSING    Pain Level:  no pain reported    Subjective: Pt reports he will be returning to work on sep 26. Objective:  Updated POC to be completed by 10th visit. INTERVENTION: COMPLETED: SPECIFICS/COMMENTS:   Modality:     fluido  10 min to increase tissue mobility   Paraffin + MHP x 15 mins with PROM stretching for increasing soft tissue mobility and active range of motion. us x 3.3/.8/100%  8 min to volar MF scar site for decreasing scar tissue to be followed with AROM tasks. AROM:     fingers X   Active flex/ext all digits.  - Extension place and holds at the PIP,jt blocking PIP and DIP with flexion place and holds at end range 2 sets x10.   - small boading balls clockwise and counter clockwise x20.   +putty roll for ext/picking out pegs   +light small peg activity , aligning 3 rows of pegs.    +scratching of other digits for dip flexion pull  - pom pom grasp with DIP of the ring finger to pull into a fist x15  - towel scrunches x10 with 3#  - in hand manipulation working on full grasp and tucking of MF DIP to keep change in the palm during saul task. - flipping cards using MF with increasing flexion of the DIP joint during completion. wrist  active flex/ext with fingers ext and flexed   AAROM:       Blocking dip/pip            x Blocking exercises dip and pip exercises. PROM/Stretching:     fingers X  X    x Dip, pip,composite passive flexion  +with mcp flexion light stretichng of pip joint  Pip and dip ext stretches        Scar Mass/Edema Control:     Soft tissue/scar massage   scar /soft tissue massage. 10 min        Strengthening:     Yellow digiflex  3 min   Red flexbar  15x2  bend and twist   L digits x - using moderate resistance putty with tips of fingers to dig in and pull back to assist with increasing DIP flexion/decrease adhesion on the FDP.   - Moderate putty to complete x15 grasp, hook. - 3# FDP roll x15 with 3# free weight   Putty and pegs  10 min roll and remove pegs. Other:     Splint adjustment x LMB provided to help with finger ext. To wear 2x daily.   - Issued large extension tube for night wear to increase extension. Assessment/Comments: Pt is making Good progress toward stated plan of care. Pt tolerated session well with continued difficulty with DIP flexion during a full fist however did demonstrate holding of the DIP slightly 50% of the time. Added splint material into extension tube for more extension stretch at night. Continue to focus on scar tissue management and place and holds with strengthening as tolerated.      -Rehab Potential: Good  Billing:    TIME IN: 9:00 am        TIME OUT: 10:00 am          CODE   TODAY MINUTES TODAY UNITS     99949 Fluidotherapy       28101 Manual       12717 Therapeutic Ex 42 2     22530 Therapeutic Activity       71262 ADL/COMP Tech Train         23362 Neuromuscular Re-Ed       06844 OrthoManagementTraining Modality:           Other: us  3.3/.8/ 8 1        Paraffin 10   1                             TOTAL   60 4                    Goals: Goals for pt can be seen on initial evaluation. Plan:   [x]  Continues Plan of care: 1x/week for 4 additional sessions for scar tissue management and increasing AROM. Treatment covered based on POC and graduated to patient's progress. Pt education continues at each visit to obtain maximum benefits from skilled OT intervention.   []  Alter Plan of care:   []  Discharge:    [facility] Miguel 87, OTR/L #[id number]

## 2023-07-03 ENCOUNTER — HOSPITAL ENCOUNTER (OUTPATIENT)
Age: 28
Setting detail: OBSERVATION
Discharge: HOME | End: 2023-07-04
Attending: FAMILY MEDICINE | Admitting: FAMILY MEDICINE
Payer: COMMERCIAL

## 2023-07-03 ENCOUNTER — APPOINTMENT (OUTPATIENT)
Dept: CT IMAGING | Age: 28
End: 2023-07-03
Payer: COMMERCIAL

## 2023-07-03 ENCOUNTER — HOSPITAL ENCOUNTER (EMERGENCY)
Age: 28
Discharge: ANOTHER HOSPITAL | End: 2023-07-03
Attending: EMERGENCY MEDICINE
Payer: COMMERCIAL

## 2023-07-03 VITALS
OXYGEN SATURATION: 96 % | HEART RATE: 63 BPM | WEIGHT: 265 LBS | HEIGHT: 76 IN | TEMPERATURE: 97.7 F | SYSTOLIC BLOOD PRESSURE: 131 MMHG | RESPIRATION RATE: 20 BRPM | BODY MASS INDEX: 32.27 KG/M2 | DIASTOLIC BLOOD PRESSURE: 75 MMHG

## 2023-07-03 DIAGNOSIS — T39.1X1A TYLENOL INGESTION, ACCIDENTAL OR UNINTENTIONAL, INITIAL ENCOUNTER: ICD-10-CM

## 2023-07-03 DIAGNOSIS — G43.901 MIGRAINE WITH STATUS MIGRAINOSUS, NOT INTRACTABLE, UNSPECIFIED MIGRAINE TYPE: Primary | ICD-10-CM

## 2023-07-03 PROBLEM — G43.909 MIGRAINE: Status: ACTIVE | Noted: 2023-07-03

## 2023-07-03 PROBLEM — T39.011A: Status: ACTIVE | Noted: 2023-07-03

## 2023-07-03 PROBLEM — R10.13 EPIGASTRIC PAIN: Status: ACTIVE | Noted: 2023-07-03

## 2023-07-03 LAB
ALBUMIN SERPL-MCNC: 4.8 G/DL (ref 3.5–5.2)
ALP SERPL-CCNC: 106 U/L (ref 40–129)
ALT SERPL-CCNC: 39 U/L (ref 0–40)
AMMONIA PLAS-SCNC: 35 UMOL/L (ref 16–60)
AMPHET UR QL SCN: NOT DETECTED
ANION GAP SERPL CALCULATED.3IONS-SCNC: 13 MMOL/L (ref 7–16)
APAP SERPL-MCNC: 30.2 MCG/ML (ref 10–30)
APAP SERPL-MCNC: 56 MCG/ML (ref 10–30)
APAP SERPL-MCNC: <5 MCG/ML (ref 10–30)
APTT: 28.2 SEC (ref 24.5–35.1)
AST SERPL-CCNC: 30 U/L (ref 0–39)
BARBITURATES UR QL SCN: POSITIVE
BASOPHILS # BLD: 0.01 E9/L (ref 0–0.2)
BASOPHILS # BLD: 0.04 E9/L (ref 0–0.2)
BASOPHILS NFR BLD: 0.1 % (ref 0–2)
BASOPHILS NFR BLD: 0.7 % (ref 0–2)
BENZODIAZ UR QL SCN: NOT DETECTED
BILIRUB SERPL-MCNC: 0.5 MG/DL (ref 0–1.2)
BUN SERPL-MCNC: 18 MG/DL (ref 6–20)
CALCIUM SERPL-MCNC: 9.2 MG/DL (ref 8.6–10.2)
CANNABINOIDS UR QL SCN: NOT DETECTED
CHLORIDE SERPL-SCNC: 104 MMOL/L (ref 98–107)
CO2 SERPL-SCNC: 24 MMOL/L (ref 22–29)
COCAINE UR QL SCN: NOT DETECTED
CREAT SERPL-MCNC: 1.3 MG/DL (ref 0.7–1.2)
DRUG SCREEN COMMENT UR-IMP: ABNORMAL
EOSINOPHIL # BLD: 0.01 E9/L (ref 0.05–0.5)
EOSINOPHIL # BLD: 0.06 E9/L (ref 0.05–0.5)
EOSINOPHIL NFR BLD: 0.1 % (ref 0–6)
EOSINOPHIL NFR BLD: 1.1 % (ref 0–6)
ERYTHROCYTE [DISTWIDTH] IN BLOOD BY AUTOMATED COUNT: 11.9 FL (ref 11.5–15)
ERYTHROCYTE [DISTWIDTH] IN BLOOD BY AUTOMATED COUNT: 12.1 FL (ref 11.5–15)
ETHANOLAMINE SERPL-MCNC: 12 MG/DL (ref 0–0.08)
FENTANYL SCREEN, URINE: NOT DETECTED
GLUCOSE SERPL-MCNC: 109 MG/DL (ref 74–99)
HCT VFR BLD AUTO: 39.9 % (ref 37–54)
HCT VFR BLD AUTO: 42.8 % (ref 37–54)
HGB BLD-MCNC: 14.4 G/DL (ref 12.5–16.5)
HGB BLD-MCNC: 15.5 G/DL (ref 12.5–16.5)
IMM GRANULOCYTES # BLD: 0.02 E9/L
IMM GRANULOCYTES # BLD: 0.03 E9/L
IMM GRANULOCYTES NFR BLD: 0.3 % (ref 0–5)
IMM GRANULOCYTES NFR BLD: 0.4 % (ref 0–5)
INR BLD: 1.1
LACTATE BLDV-SCNC: 1.3 MMOL/L (ref 0.5–2.2)
LYMPHOCYTES # BLD: 1.33 E9/L (ref 1.5–4)
LYMPHOCYTES # BLD: 1.69 E9/L (ref 1.5–4)
LYMPHOCYTES NFR BLD: 13.2 % (ref 20–42)
LYMPHOCYTES NFR BLD: 29.7 % (ref 20–42)
MCH RBC QN AUTO: 30.2 PG (ref 26–35)
MCH RBC QN AUTO: 30.4 PG (ref 26–35)
MCHC RBC AUTO-ENTMCNC: 36.1 % (ref 32–34.5)
MCHC RBC AUTO-ENTMCNC: 36.2 % (ref 32–34.5)
MCV RBC AUTO: 83.3 FL (ref 80–99.9)
MCV RBC AUTO: 84.2 FL (ref 80–99.9)
METHADONE UR QL SCN: NOT DETECTED
MONOCYTES # BLD: 0.43 E9/L (ref 0.1–0.95)
MONOCYTES # BLD: 0.44 E9/L (ref 0.1–0.95)
MONOCYTES NFR BLD: 4.4 % (ref 2–12)
MONOCYTES NFR BLD: 7.6 % (ref 2–12)
NEUTROPHILS # BLD: 3.45 E9/L (ref 1.8–7.3)
NEUTROPHILS # BLD: 8.26 E9/L (ref 1.8–7.3)
NEUTS SEG NFR BLD: 60.5 % (ref 43–80)
NEUTS SEG NFR BLD: 81.9 % (ref 43–80)
OPIATES UR QL SCN: NOT DETECTED
OXYCODONE URINE: NOT DETECTED
PCP UR QL SCN: NOT DETECTED
PLATELET # BLD AUTO: 150 E9/L (ref 130–450)
PLATELET # BLD AUTO: 167 E9/L (ref 130–450)
PMV BLD AUTO: 10.7 FL (ref 7–12)
PMV BLD AUTO: 9.9 FL (ref 7–12)
POTASSIUM SERPL-SCNC: 3.6 MMOL/L (ref 3.5–5)
PROT SERPL-MCNC: 7.3 G/DL (ref 6.4–8.3)
PROTHROMBIN TIME: 12.1 SEC (ref 9.3–12.4)
RBC # BLD AUTO: 4.74 E12/L (ref 3.8–5.8)
RBC # BLD AUTO: 5.14 E12/L (ref 3.8–5.8)
SALICYLATES SERPL-MCNC: 0.4 MG/DL (ref 0–30)
SALICYLATES SERPL-MCNC: 1.1 MG/DL (ref 0–30)
SALICYLATES SERPL-MCNC: 1.4 MG/DL (ref 0–30)
SALICYLATES SERPL-MCNC: 2.7 MG/DL (ref 0–30)
SALICYLATES SERPL-MCNC: 6.1 MG/DL (ref 0–30)
SODIUM SERPL-SCNC: 141 MMOL/L (ref 132–146)
TRICYCLIC ANTIDEPRESSANTS SCREEN SERUM: NEGATIVE NG/ML
WBC # BLD: 10.1 E9/L (ref 4.5–11.5)
WBC # BLD: 5.7 E9/L (ref 4.5–11.5)

## 2023-07-03 PROCEDURE — 85025 COMPLETE CBC W/AUTO DIFF WBC: CPT

## 2023-07-03 PROCEDURE — 96365 THER/PROPH/DIAG IV INF INIT: CPT

## 2023-07-03 PROCEDURE — 6360000002 HC RX W HCPCS: Performed by: NURSE PRACTITIONER

## 2023-07-03 PROCEDURE — 96366 THER/PROPH/DIAG IV INF ADDON: CPT

## 2023-07-03 PROCEDURE — 80179 DRUG ASSAY SALICYLATE: CPT

## 2023-07-03 PROCEDURE — 99285 EMERGENCY DEPT VISIT HI MDM: CPT

## 2023-07-03 PROCEDURE — 99222 1ST HOSP IP/OBS MODERATE 55: CPT | Performed by: STUDENT IN AN ORGANIZED HEALTH CARE EDUCATION/TRAINING PROGRAM

## 2023-07-03 PROCEDURE — 96375 TX/PRO/DX INJ NEW DRUG ADDON: CPT

## 2023-07-03 PROCEDURE — 2580000003 HC RX 258: Performed by: EMERGENCY MEDICINE

## 2023-07-03 PROCEDURE — 6360000002 HC RX W HCPCS: Performed by: STUDENT IN AN ORGANIZED HEALTH CARE EDUCATION/TRAINING PROGRAM

## 2023-07-03 PROCEDURE — 82077 ASSAY SPEC XCP UR&BREATH IA: CPT

## 2023-07-03 PROCEDURE — 80053 COMPREHEN METABOLIC PANEL: CPT

## 2023-07-03 PROCEDURE — 83605 ASSAY OF LACTIC ACID: CPT

## 2023-07-03 PROCEDURE — 80143 DRUG ASSAY ACETAMINOPHEN: CPT

## 2023-07-03 PROCEDURE — G0378 HOSPITAL OBSERVATION PER HR: HCPCS

## 2023-07-03 PROCEDURE — 85730 THROMBOPLASTIN TIME PARTIAL: CPT

## 2023-07-03 PROCEDURE — 70450 CT HEAD/BRAIN W/O DYE: CPT

## 2023-07-03 PROCEDURE — 36415 COLL VENOUS BLD VENIPUNCTURE: CPT

## 2023-07-03 PROCEDURE — APPSS45 APP SPLIT SHARED TIME 31-45 MINUTES: Performed by: NURSE PRACTITIONER

## 2023-07-03 PROCEDURE — 6360000002 HC RX W HCPCS: Performed by: EMERGENCY MEDICINE

## 2023-07-03 PROCEDURE — C9113 INJ PANTOPRAZOLE SODIUM, VIA: HCPCS | Performed by: STUDENT IN AN ORGANIZED HEALTH CARE EDUCATION/TRAINING PROGRAM

## 2023-07-03 PROCEDURE — 96374 THER/PROPH/DIAG INJ IV PUSH: CPT

## 2023-07-03 PROCEDURE — 2580000003 HC RX 258: Performed by: NURSE PRACTITIONER

## 2023-07-03 PROCEDURE — 96361 HYDRATE IV INFUSION ADD-ON: CPT

## 2023-07-03 PROCEDURE — 85610 PROTHROMBIN TIME: CPT

## 2023-07-03 PROCEDURE — 82140 ASSAY OF AMMONIA: CPT

## 2023-07-03 PROCEDURE — G0379 DIRECT REFER HOSPITAL OBSERV: HCPCS

## 2023-07-03 PROCEDURE — 80307 DRUG TEST PRSMV CHEM ANLYZR: CPT

## 2023-07-03 RX ORDER — 0.9 % SODIUM CHLORIDE 0.9 %
2000 INTRAVENOUS SOLUTION INTRAVENOUS ONCE
Status: COMPLETED | OUTPATIENT
Start: 2023-07-03 | End: 2023-07-03

## 2023-07-03 RX ORDER — PANTOPRAZOLE SODIUM 40 MG/10ML
40 INJECTION, POWDER, LYOPHILIZED, FOR SOLUTION INTRAVENOUS DAILY
Status: DISCONTINUED | OUTPATIENT
Start: 2023-07-03 | End: 2023-07-04 | Stop reason: HOSPADM

## 2023-07-03 RX ORDER — HYDROMORPHONE HYDROCHLORIDE 1 MG/ML
1 INJECTION, SOLUTION INTRAMUSCULAR; INTRAVENOUS; SUBCUTANEOUS ONCE
Status: DISCONTINUED | OUTPATIENT
Start: 2023-07-03 | End: 2023-07-03 | Stop reason: HOSPADM

## 2023-07-03 RX ORDER — SODIUM CHLORIDE 9 MG/ML
INJECTION, SOLUTION INTRAVENOUS PRN
Status: DISCONTINUED | OUTPATIENT
Start: 2023-07-03 | End: 2023-07-04 | Stop reason: HOSPADM

## 2023-07-03 RX ORDER — ENOXAPARIN SODIUM 100 MG/ML
30 INJECTION SUBCUTANEOUS 2 TIMES DAILY
Status: DISCONTINUED | OUTPATIENT
Start: 2023-07-03 | End: 2023-07-04 | Stop reason: HOSPADM

## 2023-07-03 RX ORDER — LORAZEPAM 2 MG/ML
1 INJECTION INTRAMUSCULAR ONCE
Status: COMPLETED | OUTPATIENT
Start: 2023-07-03 | End: 2023-07-03

## 2023-07-03 RX ORDER — POLYETHYLENE GLYCOL 3350 17 G/17G
17 POWDER, FOR SOLUTION ORAL DAILY PRN
Status: DISCONTINUED | OUTPATIENT
Start: 2023-07-03 | End: 2023-07-04 | Stop reason: HOSPADM

## 2023-07-03 RX ORDER — METOCLOPRAMIDE HYDROCHLORIDE 5 MG/ML
10 INJECTION INTRAMUSCULAR; INTRAVENOUS ONCE
Status: COMPLETED | OUTPATIENT
Start: 2023-07-03 | End: 2023-07-03

## 2023-07-03 RX ORDER — ONDANSETRON 2 MG/ML
4 INJECTION INTRAMUSCULAR; INTRAVENOUS EVERY 6 HOURS PRN
Status: DISCONTINUED | OUTPATIENT
Start: 2023-07-03 | End: 2023-07-04 | Stop reason: HOSPADM

## 2023-07-03 RX ORDER — METOCLOPRAMIDE HYDROCHLORIDE 5 MG/ML
5 INJECTION INTRAMUSCULAR; INTRAVENOUS ONCE
Status: DISCONTINUED | OUTPATIENT
Start: 2023-07-03 | End: 2023-07-03 | Stop reason: HOSPADM

## 2023-07-03 RX ORDER — ACETYLCYSTEINE 200 MG/ML
10000 INJECTION INTRAVENOUS ONCE
Status: DISCONTINUED | OUTPATIENT
Start: 2023-07-03 | End: 2023-07-03 | Stop reason: HOSPADM

## 2023-07-03 RX ORDER — SODIUM CHLORIDE 0.9 % (FLUSH) 0.9 %
5-40 SYRINGE (ML) INJECTION PRN
Status: DISCONTINUED | OUTPATIENT
Start: 2023-07-03 | End: 2023-07-04 | Stop reason: HOSPADM

## 2023-07-03 RX ORDER — DIPHENHYDRAMINE HYDROCHLORIDE 50 MG/ML
50 INJECTION INTRAMUSCULAR; INTRAVENOUS ONCE
Status: COMPLETED | OUTPATIENT
Start: 2023-07-03 | End: 2023-07-03

## 2023-07-03 RX ORDER — ENOXAPARIN SODIUM 100 MG/ML
40 INJECTION SUBCUTANEOUS DAILY
Status: DISCONTINUED | OUTPATIENT
Start: 2023-07-03 | End: 2023-07-03 | Stop reason: DRUGHIGH

## 2023-07-03 RX ORDER — ONDANSETRON 2 MG/ML
4 INJECTION INTRAMUSCULAR; INTRAVENOUS ONCE
Status: COMPLETED | OUTPATIENT
Start: 2023-07-03 | End: 2023-07-03

## 2023-07-03 RX ORDER — ACETYLCYSTEINE 200 MG/ML
20000 INJECTION INTRAVENOUS ONCE
Status: COMPLETED | OUTPATIENT
Start: 2023-07-03 | End: 2023-07-03

## 2023-07-03 RX ORDER — ONDANSETRON 4 MG/1
4 TABLET, ORALLY DISINTEGRATING ORAL EVERY 8 HOURS PRN
Status: DISCONTINUED | OUTPATIENT
Start: 2023-07-03 | End: 2023-07-04 | Stop reason: HOSPADM

## 2023-07-03 RX ORDER — SODIUM CHLORIDE 0.9 % (FLUSH) 0.9 %
5-40 SYRINGE (ML) INJECTION EVERY 12 HOURS SCHEDULED
Status: DISCONTINUED | OUTPATIENT
Start: 2023-07-03 | End: 2023-07-04 | Stop reason: HOSPADM

## 2023-07-03 RX ADMIN — ONDANSETRON 4 MG: 2 INJECTION INTRAMUSCULAR; INTRAVENOUS at 05:28

## 2023-07-03 RX ADMIN — ACETYLCYSTEINE 10000 MG: 200 INJECTION, SOLUTION INTRAVENOUS at 12:38

## 2023-07-03 RX ADMIN — Medication 10 ML: at 09:51

## 2023-07-03 RX ADMIN — SODIUM CHLORIDE 2000 ML: 9 INJECTION, SOLUTION INTRAVENOUS at 00:44

## 2023-07-03 RX ADMIN — SODIUM CHLORIDE 2000 ML: 9 INJECTION, SOLUTION INTRAVENOUS at 02:28

## 2023-07-03 RX ADMIN — DIPHENHYDRAMINE HYDROCHLORIDE 50 MG: 50 INJECTION, SOLUTION INTRAMUSCULAR; INTRAVENOUS at 02:17

## 2023-07-03 RX ADMIN — ACETYLCYSTEINE 20000 MG: 200 INJECTION, SOLUTION INTRAVENOUS at 04:50

## 2023-07-03 RX ADMIN — LORAZEPAM 1 MG: 2 INJECTION INTRAMUSCULAR; INTRAVENOUS at 02:21

## 2023-07-03 RX ADMIN — PANTOPRAZOLE SODIUM 40 MG: 40 INJECTION, POWDER, FOR SOLUTION INTRAVENOUS at 12:57

## 2023-07-03 RX ADMIN — METOCLOPRAMIDE 10 MG: 5 INJECTION, SOLUTION INTRAMUSCULAR; INTRAVENOUS at 02:18

## 2023-07-03 RX ADMIN — Medication 10 ML: at 21:58

## 2023-07-03 ASSESSMENT — PAIN - FUNCTIONAL ASSESSMENT
PAIN_FUNCTIONAL_ASSESSMENT: NONE - DENIES PAIN
PAIN_FUNCTIONAL_ASSESSMENT: NONE - DENIES PAIN
PAIN_FUNCTIONAL_ASSESSMENT: 0-10
PAIN_FUNCTIONAL_ASSESSMENT: NONE - DENIES PAIN
PAIN_FUNCTIONAL_ASSESSMENT: PREVENTS OR INTERFERES SOME ACTIVE ACTIVITIES AND ADLS

## 2023-07-03 ASSESSMENT — PAIN DESCRIPTION - DESCRIPTORS: DESCRIPTORS: ACHING;DULL;DISCOMFORT

## 2023-07-03 ASSESSMENT — PAIN DESCRIPTION - ONSET: ONSET: ON-GOING

## 2023-07-03 ASSESSMENT — PAIN SCALES - GENERAL: PAINLEVEL_OUTOF10: 5

## 2023-07-03 ASSESSMENT — PAIN DESCRIPTION - LOCATION: LOCATION: HEAD

## 2023-07-03 ASSESSMENT — PAIN DESCRIPTION - PAIN TYPE: TYPE: ACUTE PAIN

## 2023-07-03 ASSESSMENT — PAIN DESCRIPTION - FREQUENCY: FREQUENCY: CONTINUOUS

## 2023-07-03 NOTE — PROGRESS NOTES
Pharmacist Review and Automatic Dose Adjustment of Prophylactic Enoxaparin         The reviewing pharmacist has made an adjustment to the ordered enoxaparin dose or converted to UFH per the approved [facility] protocol and table as identified below. Leobardo Tony is a 33 y.o. male. Recent Labs     07/03/23  0050   CREATININE 1.3*       Estimated Creatinine Clearance: 121 mL/min (A) (based on SCr of 1.3 mg/dL (H)).     Recent Labs     07/03/23  0050   HGB 15.5   HCT 42.8        Recent Labs     07/03/23  0535   INR 1.1       Height:   Ht Readings from Last 1 Encounters:   07/03/23 6' 4\" (1.93 m)     Weight:  Wt Readings from Last 1 Encounters:   07/03/23 265 lb (120.2 kg)               Plan: Based upon the patient's weight and renal function    Ordered: Enoxaparin 40mg SUBQ Daily    Changed/converted to    New Order: Enoxaparin 30mg SUBQ BID      Thank you,  [id number] Kelly [facility]  7/3/2023, 8:41 AM

## 2023-07-03 NOTE — ED NOTES
Gene brought medication bottles from home that are empty: Ibuprofen 800 mg x 20 tablets and Butabital/Acetaminophen/Caffeine -40 mg x 30 tablets. Unsure of how many tablets were in the bottles. Patient states he has been taking pills all day. Thinks he took 3-4 excedrin migraine but unsure how many of the other medications he took.       Elizabeth Omer RN  07/03/23 0120

## 2023-07-03 NOTE — ED NOTES
Called Poison Control per Dr Joya Macias request: Acetaminophen level =56. Patient took Excedrin Migraine potentially 6 tablets and also took Butalbital -40mg, unknown quantity. Recommendation per poison control, if Tylenol level > 20 or liver enzymes elevated, will need to receive IV Acetidote 20 hour protocol - 20 gm in 500 ml NS to run over 4 hours. Then will need to have 2nd bag of the Acetidote IV 10 gm in 1000ml NS to run over 16 hrs. Will also need to have acetaminophen levels drawn 12 hrs after initiation of acetidote IV. If the acetaminophen level normalizes, the infusion may be stopped early. Also will need to have Salicylate level drawn every 2 hours; if salicylate level 85HJ/AS, will need sodium bicarb. Call poison control for guidelines for sodium bicarb infusion. If Salicylate level is normal x 2 times, may discontinue every 2 hour levels.       Mahendra Haddad RN  07/03/23 [address] MENEZES RN  07/03/23 8874

## 2023-07-03 NOTE — H&P
[address] [facility]   History and Physical    CHIEF COMPLAINT:  Headache, acetaminophen overdose    History of Present Illness:  João Keen is a 33 y.o. male with no significant past medical history who presented to [facility] ER overnight for headache that was ongoing >12h despite taking large amount of Excedrin and Fioricet tablets. Pt states he gets occasional headaches, but never as severe as yesterday. States he took approx 5-6 excedrin, 5-6 fioricet tablets. Pt became worried that he took too much acetaminophen, prompting him to seek medical attention. Pt states his headache has since resolved. He reports that his stomach is currently upset. Pt initial acetamimnophen level was 56, repeat 30. UDS positive for barbiturates (pt had reported taking fioricet). [place] [facility] contacted [facility], who recommended: If acetaminophen level >20, or transaminitis-will need IV Acetadote 20g over 4h. Will then need to have 2nd dose of acetadote IV 10g in 1L over 16h. Salicylate level T9U. While in ER, pt received benadryl, reglan, ativan, dilaudid, zofran, and was started on acetadote infusion of 20kmg. REVIEW OF SYSTEMS:  no fevers, chills, cp, sob, wt changes, hot/cold flashes, other open skin lesions, diarrhea, constipation, dysuria/hematuria unless noted in HPI. Complete ROS performed with the patient and is otherwise negative. ALLERGIES:  Pcn [penicillins]    PAST MEDICAL & SURGICAL HISTORY:  Pt  has a past medical history of Cervical (neck) region somatic dysfunction, Cervical paraspinal muscle spasm, COVID, Dizziness and giddiness, and Tendon rupture, nontraumatic. Trev Vivar Pt  has a past surgical history that includes [place] tooth extraction; Arm Surgery (Left, 07/06/2022); and Tendon repair. Social History:  Pt  reports that he has never smoked. His smokeless tobacco use includes chew. He reports current alcohol use. He reports that he does not use drugs. Trev Vivar

## 2023-07-03 NOTE — CARE COORDINATION
Case Management Assessment  Initial Evaluation    Date/Time of Evaluation: 7/3/2023 11:43 AM  Assessment Completed by: Aleks Coleman RN    If patient is discharged prior to next notation, then this note serves as note for discharge by case management. Patient Name: Jaziel Gonsalez                   YOB: 1995  Diagnosis: Acetaminophen overdose, accidental or unintentional, initial encounter [facility]                   Date / Time: 7/3/2023  6:49 AM    Patient Admission Status: Observation   Readmission Risk (Low < 19, Mod (19-27), High > 27): Readmission Risk Score: 5.1    Current PCP: Archie Jacome, DO  PCP verified by CM? Yes    Chart Reviewed: Yes      History Provided by: Patient  Patient Orientation: Alert and Oriented, Person, Place, Situation    Patient Cognition: Alert    Hospitalization in the last 30 days (Readmission):  No    Advance Directives:      Code Status: Full Code   Patient's Primary Decision Maker is: Legal Next of Kin    Primary Decision MakeSuzi Middleton - [facility] - [phone]    Discharge Planning:    Patient lives with:   Type of Home:    Primary Care Giver: Self  Patient Support Systems include: Parent, Family Members   Current Financial resources:    Current community resources:    Current services prior to admission:              Current DME:              Type of Home Care services:       ADLS  Prior functional level: Independent in ADLs/IADLs  Current functional level: Independent in ADLs/IADLs      Family can provide assistance at DC: Yes  Would you like Case Management to discuss the discharge plan with any other family members/significant others, and if so, who?  Yes (if needed with mother Lety Walden)  Plans to Return to Present Housing: Yes  Potential Assistance needed at discharge:              Potential DME:    Patient expects to discharge to:    Plan for transportation at discharge:      Financial    Payor: Jaqui Figueroa / Plan: Zuleyka Santos /

## 2023-07-03 NOTE — PLAN OF CARE
Problem: Gastrointestinal - Adult  Goal: Minimal or absence of nausea and vomiting  Outcome: Progressing     Problem: Gastrointestinal - Adult  Goal: Maintains adequate nutritional intake  Outcome: Progressing

## 2023-07-04 VITALS
TEMPERATURE: 97.9 F | SYSTOLIC BLOOD PRESSURE: 133 MMHG | OXYGEN SATURATION: 99 % | HEART RATE: 70 BPM | RESPIRATION RATE: 16 BRPM | DIASTOLIC BLOOD PRESSURE: 95 MMHG

## 2023-07-04 LAB
ALBUMIN SERPL-MCNC: 4.2 G/DL (ref 3.5–5.2)
ALP SERPL-CCNC: 91 U/L (ref 40–129)
ALT SERPL-CCNC: 28 U/L (ref 0–40)
ANION GAP SERPL CALCULATED.3IONS-SCNC: 8 MMOL/L (ref 7–16)
AST SERPL-CCNC: 18 U/L (ref 0–39)
BASOPHILS # BLD: 0.01 E9/L (ref 0–0.2)
BASOPHILS NFR BLD: 0.2 % (ref 0–2)
BILIRUB DIRECT SERPL-MCNC: <0.2 MG/DL (ref 0–0.3)
BILIRUB INDIRECT SERPL-MCNC: ABNORMAL MG/DL (ref 0–1)
BILIRUB SERPL-MCNC: 0.4 MG/DL (ref 0–1.2)
BUN SERPL-MCNC: 9 MG/DL (ref 6–20)
CALCIUM SERPL-MCNC: 9.2 MG/DL (ref 8.6–10.2)
CHLORIDE SERPL-SCNC: 106 MMOL/L (ref 98–107)
CO2 SERPL-SCNC: 24 MMOL/L (ref 22–29)
CREAT SERPL-MCNC: 1 MG/DL (ref 0.7–1.2)
EOSINOPHIL # BLD: 0.05 E9/L (ref 0.05–0.5)
EOSINOPHIL NFR BLD: 0.8 % (ref 0–6)
ERYTHROCYTE [DISTWIDTH] IN BLOOD BY AUTOMATED COUNT: 12 FL (ref 11.5–15)
GLUCOSE SERPL-MCNC: 101 MG/DL (ref 74–99)
HCT VFR BLD AUTO: 39.9 % (ref 37–54)
HGB BLD-MCNC: 14.1 G/DL (ref 12.5–16.5)
IMM GRANULOCYTES # BLD: 0.01 E9/L
IMM GRANULOCYTES NFR BLD: 0.2 % (ref 0–5)
LYMPHOCYTES # BLD: 1.57 E9/L (ref 1.5–4)
LYMPHOCYTES NFR BLD: 25.9 % (ref 20–42)
MCH RBC QN AUTO: 30.2 PG (ref 26–35)
MCHC RBC AUTO-ENTMCNC: 35.3 % (ref 32–34.5)
MCV RBC AUTO: 85.4 FL (ref 80–99.9)
MONOCYTES # BLD: 0.34 E9/L (ref 0.1–0.95)
MONOCYTES NFR BLD: 5.6 % (ref 2–12)
NEUTROPHILS # BLD: 4.08 E9/L (ref 1.8–7.3)
NEUTS SEG NFR BLD: 67.3 % (ref 43–80)
PLATELET # BLD AUTO: 139 E9/L (ref 130–450)
PMV BLD AUTO: 10.3 FL (ref 7–12)
POTASSIUM SERPL-SCNC: 3.9 MMOL/L (ref 3.5–5)
PROT SERPL-MCNC: 6.3 G/DL (ref 6.4–8.3)
RBC # BLD AUTO: 4.67 E12/L (ref 3.8–5.8)
SODIUM SERPL-SCNC: 138 MMOL/L (ref 132–146)
WBC # BLD: 6.1 E9/L (ref 4.5–11.5)

## 2023-07-04 PROCEDURE — APPSS45 APP SPLIT SHARED TIME 31-45 MINUTES: Performed by: NURSE PRACTITIONER

## 2023-07-04 PROCEDURE — 6360000002 HC RX W HCPCS: Performed by: STUDENT IN AN ORGANIZED HEALTH CARE EDUCATION/TRAINING PROGRAM

## 2023-07-04 PROCEDURE — 96376 TX/PRO/DX INJ SAME DRUG ADON: CPT

## 2023-07-04 PROCEDURE — G0378 HOSPITAL OBSERVATION PER HR: HCPCS

## 2023-07-04 PROCEDURE — 2580000003 HC RX 258: Performed by: NURSE PRACTITIONER

## 2023-07-04 PROCEDURE — 80048 BASIC METABOLIC PNL TOTAL CA: CPT

## 2023-07-04 PROCEDURE — 36415 COLL VENOUS BLD VENIPUNCTURE: CPT

## 2023-07-04 PROCEDURE — C9113 INJ PANTOPRAZOLE SODIUM, VIA: HCPCS | Performed by: STUDENT IN AN ORGANIZED HEALTH CARE EDUCATION/TRAINING PROGRAM

## 2023-07-04 PROCEDURE — 96366 THER/PROPH/DIAG IV INF ADDON: CPT

## 2023-07-04 PROCEDURE — 99239 HOSP IP/OBS DSCHRG MGMT >30: CPT | Performed by: STUDENT IN AN ORGANIZED HEALTH CARE EDUCATION/TRAINING PROGRAM

## 2023-07-04 PROCEDURE — 80076 HEPATIC FUNCTION PANEL: CPT

## 2023-07-04 PROCEDURE — 85025 COMPLETE CBC W/AUTO DIFF WBC: CPT

## 2023-07-04 RX ORDER — PANTOPRAZOLE SODIUM 40 MG/1
40 TABLET, DELAYED RELEASE ORAL
Qty: 30 TABLET | Refills: 0 | Status: SHIPPED | OUTPATIENT
Start: 2023-07-04

## 2023-07-04 RX ADMIN — PANTOPRAZOLE SODIUM 40 MG: 40 INJECTION, POWDER, FOR SOLUTION INTRAVENOUS at 08:42

## 2023-07-04 RX ADMIN — Medication 10 ML: at 08:49

## 2023-07-04 NOTE — DISCHARGE SUMMARY
[address] [facility]   Discharge Summary      Ryan Hsu DO  [address] [facility] [address] [address] [phone]    Schedule an appointment as soon as possible for a visit in 1 week(s)        Activity level: As tolerated    Diet: ADULT DIET; Regular    Labs:Per PCP    Condition at discharge: Stable    Dispo:Discharge home      Patient ID:  Irma Park  [id number]  65 y.o.  1995      Discharge date and time:  7/4/2023  11:55 AM    Admission Diagnoses: Principal Problem:    Acetaminophen overdose, accidental or unintentional, initial encounter  Active Problems:    Accidental aspirin overdose    Epigastric pain    Migraine  Resolved Problems:    * No resolved hospital problems. *      Discharge Diagnoses: Principal Problem:    Acetaminophen overdose, accidental or unintentional, initial encounter  Active Problems:    Accidental aspirin overdose    Epigastric pain    Migraine  Resolved Problems:    * No resolved hospital problems. *      Past Medical History:  has a past medical history of Cervical (neck) region somatic dysfunction, Cervical paraspinal muscle spasm, COVID, Dizziness and giddiness, and Tendon rupture, nontraumatic. .       Consults:  None    Procedures: N/A    Hospital Course: 26yo male no no significant past medical hx who presented to [facility] ER on 7/3 with concern that he had taken too much acetaminophen while trying to relieve his headache. He reported taking 5-6 Excedrin and 5-6 Fioricet tablets over a 12h span. Initial serum acetaminophen level was 56. [place] ER contacted [facility], who recommended: If acetaminophen level >20, or transaminitis-will need IV Acetadote 20g over 4h. Will then need to have 2nd dose of acetadote IV 10g in 1L over 16h. Salicylate level G2F and repeat serum acetaminophen 12h into acetadote infusion.    Pt was transferred to our facility from [place] with initial acetadote infusion running, 2nd

## 2023-07-04 NOTE — DISCHARGE INSTRUCTIONS
Your information:  Name: James Jacobson  : 1995    Your instructions:    YOU ARE BEING DISCHARGED HOME. PLEASE MAKE AND KEEP YOUR FOLLOW UP APPOINTMENTS. What to do after you leave the hospital:    Recommended diet: regular diet    Recommended activity: activity as tolerated    IF YOU EXPERIENCE ANY OF THE FOLLOWING SYMPTOMS, CHEST PAIN, SHORTNESS OF BREATH, COUGHING UP BLOOD OR BLOODY SPUTUM, STOMACH PAIN OR CRAMPING, DARK, TARRY STOOLS, LOSS OF APPETITE, GENERAL NOT FEELING WELL, SIGNS AND SYMPTOMS OF INFECTION LIKE FEVER AND OR CHILLS, PLEASE CALL DR Juliette Atkins DO OR RETURN TO THE EMERGENCY ROOM. The following personal items were collected during your admission and were returned to you:         Information obtained by:  By signing below, I understand that if any problems occur once I leave the hospital I am to contact  Juliette Atkins DO or go to emergency room. I understand and acknowledge receipt of the instructions indicated above.

## 2023-07-05 ENCOUNTER — TELEPHONE (OUTPATIENT)
Dept: PRIMARY CARE CLINIC | Age: 28
End: 2023-07-05

## 2023-07-05 NOTE — TELEPHONE ENCOUNTER
Care Transitions Initial Follow Up Call    Outreach made within 2 business days of discharge: Yes    Patient: Mauricio Shin Patient : 1995   MRN: [id number]  Reason for Admission: There are no discharge diagnoses documented for the most recent discharge. Discharge Date: 23       Spoke with: JENNIFER for patient to contact office. Scheduled appointment with PCP within 7-14 days    Follow Up  No future appointments.     Taylor Magallon MA

## 2023-11-19 ENCOUNTER — HOSPITAL ENCOUNTER (EMERGENCY)
Age: 28
Discharge: HOME | End: 2023-11-20
Attending: EMERGENCY MEDICINE
Payer: COMMERCIAL

## 2023-11-19 DIAGNOSIS — J06.9 ACUTE UPPER RESPIRATORY INFECTION: Primary | ICD-10-CM

## 2023-11-19 DIAGNOSIS — J02.9 ACUTE PHARYNGITIS, UNSPECIFIED ETIOLOGY: ICD-10-CM

## 2023-11-19 PROCEDURE — 99285 EMERGENCY DEPT VISIT HI MDM: CPT

## 2023-11-19 PROCEDURE — 96374 THER/PROPH/DIAG INJ IV PUSH: CPT

## 2023-11-19 PROCEDURE — 87635 SARS-COV-2 COVID-19 AMP PRB: CPT

## 2023-11-19 PROCEDURE — 85025 COMPLETE CBC W/AUTO DIFF WBC: CPT

## 2023-11-19 PROCEDURE — 80053 COMPREHEN METABOLIC PANEL: CPT

## 2023-11-19 PROCEDURE — 87651 STREP A DNA AMP PROBE: CPT

## 2023-11-19 PROCEDURE — 87502 INFLUENZA DNA AMP PROBE: CPT

## 2023-11-19 PROCEDURE — 86308 HETEROPHILE ANTIBODY SCREEN: CPT

## 2023-11-19 ASSESSMENT — PAIN DESCRIPTION - LOCATION: LOCATION: THROAT

## 2023-11-19 ASSESSMENT — PAIN SCALES - GENERAL: PAINLEVEL_OUTOF10: 7

## 2023-11-19 ASSESSMENT — PAIN - FUNCTIONAL ASSESSMENT: PAIN_FUNCTIONAL_ASSESSMENT: 0-10

## 2023-11-19 ASSESSMENT — PAIN DESCRIPTION - PAIN TYPE: TYPE: ACUTE PAIN

## 2023-11-20 ENCOUNTER — APPOINTMENT (OUTPATIENT)
Dept: CT IMAGING | Age: 28
End: 2023-11-20
Payer: COMMERCIAL

## 2023-11-20 ENCOUNTER — APPOINTMENT (OUTPATIENT)
Dept: GENERAL RADIOLOGY | Age: 28
End: 2023-11-20
Payer: COMMERCIAL

## 2023-11-20 VITALS
TEMPERATURE: 98.1 F | DIASTOLIC BLOOD PRESSURE: 85 MMHG | SYSTOLIC BLOOD PRESSURE: 122 MMHG | WEIGHT: 264.55 LBS | HEART RATE: 77 BPM | RESPIRATION RATE: 16 BRPM | HEIGHT: 76 IN | BODY MASS INDEX: 32.22 KG/M2 | OXYGEN SATURATION: 95 %

## 2023-11-20 LAB
ALBUMIN SERPL-MCNC: 4.5 G/DL (ref 3.5–5.2)
ALP SERPL-CCNC: 113 U/L (ref 40–129)
ALT SERPL-CCNC: 37 U/L (ref 0–40)
ANION GAP SERPL CALCULATED.3IONS-SCNC: 10 MMOL/L (ref 7–16)
AST SERPL-CCNC: 24 U/L (ref 0–39)
BASOPHILS # BLD: 0.02 K/UL (ref 0–0.2)
BASOPHILS NFR BLD: 0 % (ref 0–2)
BILIRUB SERPL-MCNC: 0.3 MG/DL (ref 0–1.2)
BUN SERPL-MCNC: 18 MG/DL (ref 6–20)
CALCIUM SERPL-MCNC: 9.5 MG/DL (ref 8.6–10.2)
CHLORIDE SERPL-SCNC: 103 MMOL/L (ref 98–107)
CO2 SERPL-SCNC: 27 MMOL/L (ref 22–29)
CREAT SERPL-MCNC: 1.1 MG/DL (ref 0.7–1.2)
EOSINOPHIL # BLD: 0.08 K/UL (ref 0.05–0.5)
EOSINOPHILS RELATIVE PERCENT: 1 % (ref 0–6)
ERYTHROCYTE [DISTWIDTH] IN BLOOD BY AUTOMATED COUNT: 12.3 % (ref 11.5–15)
GFR SERPL CREATININE-BSD FRML MDRD: >60 ML/MIN/1.73M2
GLUCOSE SERPL-MCNC: 100 MG/DL (ref 74–99)
HCT VFR BLD AUTO: 44.2 % (ref 37–54)
HETEROPH AB BLD QL IA: NEGATIVE
HGB BLD-MCNC: 15.3 G/DL (ref 12.5–16.5)
IMM GRANULOCYTES # BLD AUTO: <0.03 K/UL (ref 0–0.58)
IMM GRANULOCYTES NFR BLD: 0 % (ref 0–5)
INFLUENZA A BY PCR: NOT DETECTED
INFLUENZA B BY PCR: NOT DETECTED
LYMPHOCYTES NFR BLD: 2 K/UL (ref 1.5–4)
LYMPHOCYTES RELATIVE PERCENT: 33 % (ref 20–42)
MCH RBC QN AUTO: 29.8 PG (ref 26–35)
MCHC RBC AUTO-ENTMCNC: 34.6 G/DL (ref 32–34.5)
MCV RBC AUTO: 86 FL (ref 80–99.9)
MONOCYTES NFR BLD: 0.39 K/UL (ref 0.1–0.95)
MONOCYTES NFR BLD: 7 % (ref 2–12)
NEUTROPHILS NFR BLD: 59 % (ref 43–80)
NEUTS SEG NFR BLD: 3.52 K/UL (ref 1.8–7.3)
PLATELET # BLD AUTO: 182 K/UL (ref 130–450)
PMV BLD AUTO: 10.4 FL (ref 7–12)
POTASSIUM SERPL-SCNC: 4.1 MMOL/L (ref 3.5–5)
PROT SERPL-MCNC: 7 G/DL (ref 6.4–8.3)
RBC # BLD AUTO: 5.14 M/UL (ref 3.8–5.8)
RSV BY PCR: NOT DETECTED
SARS-COV-2 RDRP RESP QL NAA+PROBE: NOT DETECTED
SODIUM SERPL-SCNC: 140 MMOL/L (ref 132–146)
SPECIMEN DESCRIPTION: NORMAL
SPECIMEN SOURCE: NORMAL
SPECIMEN SOURCE: NORMAL
STREP A, MOLECULAR: NEGATIVE
WBC OTHER # BLD: 6 K/UL (ref 4.5–11.5)

## 2023-11-20 PROCEDURE — 70491 CT SOFT TISSUE NECK W/DYE: CPT

## 2023-11-20 PROCEDURE — 6370000000 HC RX 637 (ALT 250 FOR IP)

## 2023-11-20 PROCEDURE — 6370000000 HC RX 637 (ALT 250 FOR IP): Performed by: EMERGENCY MEDICINE

## 2023-11-20 PROCEDURE — 94664 DEMO&/EVAL PT USE INHALER: CPT

## 2023-11-20 PROCEDURE — 87634 RSV DNA/RNA AMP PROBE: CPT

## 2023-11-20 PROCEDURE — 94640 AIRWAY INHALATION TREATMENT: CPT

## 2023-11-20 PROCEDURE — 71045 X-RAY EXAM CHEST 1 VIEW: CPT

## 2023-11-20 PROCEDURE — 6360000004 HC RX CONTRAST MEDICATION: Performed by: RADIOLOGY

## 2023-11-20 PROCEDURE — 6360000002 HC RX W HCPCS: Performed by: EMERGENCY MEDICINE

## 2023-11-20 RX ORDER — IPRATROPIUM BROMIDE AND ALBUTEROL SULFATE 2.5; .5 MG/3ML; MG/3ML
1 SOLUTION RESPIRATORY (INHALATION)
Status: COMPLETED | OUTPATIENT
Start: 2023-11-20 | End: 2023-11-20

## 2023-11-20 RX ORDER — ALBUTEROL SULFATE 90 UG/1
2 AEROSOL, METERED RESPIRATORY (INHALATION) 4 TIMES DAILY PRN
Qty: 18 G | Refills: 0 | Status: SHIPPED | OUTPATIENT
Start: 2023-11-20

## 2023-11-20 RX ORDER — PREDNISONE 20 MG/1
20 TABLET ORAL 2 TIMES DAILY
Qty: 10 TABLET | Refills: 0 | Status: SHIPPED | OUTPATIENT
Start: 2023-11-20 | End: 2023-11-25

## 2023-11-20 RX ORDER — DEXAMETHASONE SODIUM PHOSPHATE 10 MG/ML
10 INJECTION INTRAMUSCULAR; INTRAVENOUS ONCE
Status: COMPLETED | OUTPATIENT
Start: 2023-11-20 | End: 2023-11-20

## 2023-11-20 RX ADMIN — IOPAMIDOL 75 ML: 755 INJECTION, SOLUTION INTRAVENOUS at 01:36

## 2023-11-20 RX ADMIN — DEXAMETHASONE SODIUM PHOSPHATE 10 MG: 10 INJECTION INTRAMUSCULAR; INTRAVENOUS at 01:12

## 2023-11-20 RX ADMIN — IPRATROPIUM BROMIDE AND ALBUTEROL SULFATE 1 DOSE: .5; 2.5 SOLUTION RESPIRATORY (INHALATION) at 00:27

## 2023-11-20 RX ADMIN — IPRATROPIUM BROMIDE AND ALBUTEROL SULFATE 1 DOSE: .5; 2.5 SOLUTION RESPIRATORY (INHALATION) at 00:28

## 2023-11-20 RX ADMIN — LIDOCAINE HYDROCHLORIDE 5 ML: 20 SOLUTION ORAL; TOPICAL at 01:54

## 2023-11-20 RX ADMIN — IPRATROPIUM BROMIDE AND ALBUTEROL SULFATE 1 DOSE: .5; 2.5 SOLUTION RESPIRATORY (INHALATION) at 00:26

## 2023-11-20 NOTE — ED PROVIDER NOTES
[facility]        Pt Name: Guerline Hernandez  MRN: [id number]  [address] [facility] [place] 1995  Date of evaluation: 11/19/2023  Provider: Ricarda Cordero DO  PCP: Amrit Mitchell DO  Note Started: 11:16 PM EST 11/19/23    CHIEF COMPLAINT       Chief Complaint   Patient presents with    Pharyngitis     X3 weeks    Neck Pain     Pt report neck is swollen \"in some areas\" worried because his \"grandfather had Hodgkin's Lymphoma\"    Cough     Pt states he has to \"clear his throat\" a lot. HISTORY OF PRESENT ILLNESS: 1 or more Elements   History From: Patient    Limitations to history : None  Social Determinants : None    Guerline Hernandez is a 33 y.o. male who presents for sore throat. Patient states this has been going on for 5 weeks. He states that it started with cough and nasal congestion. His nasal congestion has improved, but still has a cough. He also complains of some anterior neck pain. He feels his lymph nodes are swollen. He also complains of on and off fevers for the past 5 weeks. He has not taken anything for his fever today. He denies any night sweats or unintentional weight loss. Denies any n/v, headache, dizziness, vision changes, neck tenderness or stiffness, weakness, cp, sob, abd pain, dysuria, hematuria, diarrhea, constipation, bloody stools. Nursing Notes were all reviewed and agreed with or any disagreements were addressed in the HPI.    ROS:   Pertinent positives and negatives are stated within HPI, all other systems reviewed and are negative.      --------------------------------------------- PAST HISTORY ---------------------------------------------  Past Medical History:  has a past medical history of Cervical (neck) region somatic dysfunction, Cervical paraspinal muscle spasm, COVID, Dizziness and giddiness, and Tendon rupture, nontraumatic.     Past Surgical History:  has a past surgical history that

## 2023-11-20 NOTE — DISCHARGE INSTRUCTIONS
Call family doctor tomorrow and schedule a followup appointment to be seen in 2 days    Have your doctor obtain  finalized report of CT scan today    CT SOFT TISSUE NECK W CONTRAST   Final Result   No acute abnormality of the soft tissue structures of the neck. XR CHEST PORTABLE   Final Result   No evidence of active cardiopulmonary pathology.

## 2023-11-20 NOTE — ED NOTES
Department of Emergency Medicine  FIRST PROVIDER TRIAGE NOTE             Independent MLP           11/19/23  9:49 PM EST    Date of Encounter: 11/19/23   MRN: [id number]      HPI: Cirilo Coffman is a 33 y.o. male who presents to the ED for Pharyngitis (X3 weeks), Neck Pain (Pt report neck is swollen \"in some areas\" worried because his \"grandfather had Hodgkin's Lymphoma\"), and Cough (Pt states he has to \"clear his throat\" a lot.)  Patient states that for the past 3 weeks he has had fevers, cough, sore throat as well as neck pain. He states that he has noticed swelling in some areas of his neck. Patient states that his grandfather has a history of Hodgkin's lymphoma, and he is concerned. He denies chest pain or shortness of breath. ROS: Negative for cp or sob. PE: Gen Appearance/Constitutional: alert  CV: regular rate     Initial Plan of Care: All treatment areas with department are currently occupied. Plan to order/Initiate the following while awaiting opening in ED: .   Initiate Treatment-Testing, Proceed toTreatment Area When Bed Available for ED Attending/MLP to Continue Care    Electronically signed by RYAN Cm CNP   DD: 11/19/23       RYAN Cm CNP  11/19/23 [id number]

## 2023-11-21 ENCOUNTER — OFFICE VISIT (OUTPATIENT)
Dept: PRIMARY CARE CLINIC | Age: 28
End: 2023-11-21
Payer: COMMERCIAL

## 2023-11-21 VITALS
HEIGHT: 76 IN | DIASTOLIC BLOOD PRESSURE: 76 MMHG | BODY MASS INDEX: 33.24 KG/M2 | RESPIRATION RATE: 20 BRPM | TEMPERATURE: 98.6 F | WEIGHT: 273 LBS | SYSTOLIC BLOOD PRESSURE: 128 MMHG | OXYGEN SATURATION: 97 % | HEART RATE: 85 BPM

## 2023-11-21 DIAGNOSIS — M50.90 CERVICAL DISC DISEASE: ICD-10-CM

## 2023-11-21 DIAGNOSIS — J06.9 VIRAL UPPER RESPIRATORY TRACT INFECTION: Primary | ICD-10-CM

## 2023-11-21 DIAGNOSIS — M54.2 NECK PAIN: ICD-10-CM

## 2023-11-21 PROCEDURE — G8427 DOCREV CUR MEDS BY ELIG CLIN: HCPCS | Performed by: FAMILY MEDICINE

## 2023-11-21 PROCEDURE — G8484 FLU IMMUNIZE NO ADMIN: HCPCS | Performed by: FAMILY MEDICINE

## 2023-11-21 PROCEDURE — G8417 CALC BMI ABV UP PARAM F/U: HCPCS | Performed by: FAMILY MEDICINE

## 2023-11-21 PROCEDURE — 4004F PT TOBACCO SCREEN RCVD TLK: CPT | Performed by: FAMILY MEDICINE

## 2023-11-21 PROCEDURE — 96372 THER/PROPH/DIAG INJ SC/IM: CPT | Performed by: FAMILY MEDICINE

## 2023-11-21 PROCEDURE — 99214 OFFICE O/P EST MOD 30 MIN: CPT | Performed by: FAMILY MEDICINE

## 2023-11-21 RX ORDER — KETOROLAC TROMETHAMINE 30 MG/ML
30 INJECTION, SOLUTION INTRAMUSCULAR; INTRAVENOUS ONCE
Status: COMPLETED | OUTPATIENT
Start: 2023-11-21 | End: 2023-11-21

## 2023-11-21 RX ADMIN — KETOROLAC TROMETHAMINE 30 MG: 30 INJECTION, SOLUTION INTRAMUSCULAR; INTRAVENOUS at 11:07

## 2023-11-21 SDOH — ECONOMIC STABILITY: FOOD INSECURITY: WITHIN THE PAST 12 MONTHS, THE FOOD YOU BOUGHT JUST DIDN'T LAST AND YOU DIDN'T HAVE MONEY TO GET MORE.: NEVER TRUE

## 2023-11-21 SDOH — ECONOMIC STABILITY: FOOD INSECURITY: WITHIN THE PAST 12 MONTHS, YOU WORRIED THAT YOUR FOOD WOULD RUN OUT BEFORE YOU GOT MONEY TO BUY MORE.: NEVER TRUE

## 2023-11-21 SDOH — ECONOMIC STABILITY: HOUSING INSECURITY
IN THE LAST 12 MONTHS, WAS THERE A TIME WHEN YOU DID NOT HAVE A STEADY PLACE TO SLEEP OR SLEPT IN A SHELTER (INCLUDING NOW)?: NO

## 2023-11-21 SDOH — ECONOMIC STABILITY: INCOME INSECURITY: HOW HARD IS IT FOR YOU TO PAY FOR THE VERY BASICS LIKE FOOD, HOUSING, MEDICAL CARE, AND HEATING?: NOT HARD AT ALL

## 2023-11-21 ASSESSMENT — ENCOUNTER SYMPTOMS
BACK PAIN: 0
ABDOMINAL PAIN: 0
APNEA: 0
SORE THROAT: 0
DIARRHEA: 0
BLOOD IN STOOL: 0
CHEST TIGHTNESS: 0
FACIAL SWELLING: 0
VOMITING: 0
WHEEZING: 0
SHORTNESS OF BREATH: 0
COLOR CHANGE: 0
NAUSEA: 0
SINUS PRESSURE: 0
PHOTOPHOBIA: 0
ALLERGIC/IMMUNOLOGIC NEGATIVE: 1
COUGH: 0

## 2023-11-21 ASSESSMENT — PATIENT HEALTH QUESTIONNAIRE - PHQ9
SUM OF ALL RESPONSES TO PHQ9 QUESTIONS 1 & 2: 0
SUM OF ALL RESPONSES TO PHQ QUESTIONS 1-9: 0
2. FEELING DOWN, DEPRESSED OR HOPELESS: 0
1. LITTLE INTEREST OR PLEASURE IN DOING THINGS: 0
SUM OF ALL RESPONSES TO PHQ QUESTIONS 1-9: 0

## 2023-12-01 ENCOUNTER — HOSPITAL ENCOUNTER (OUTPATIENT)
Dept: MRI IMAGING | Age: 28
Discharge: HOME | End: 2023-12-01
Attending: FAMILY MEDICINE
Payer: COMMERCIAL

## 2023-12-01 DIAGNOSIS — M50.90 CERVICAL DISC DISEASE: ICD-10-CM

## 2023-12-01 DIAGNOSIS — M54.2 NECK PAIN: ICD-10-CM

## 2023-12-01 PROCEDURE — 72141 MRI NECK SPINE W/O DYE: CPT

## 2023-12-03 DIAGNOSIS — M50.20 PROTRUSION OF CERVICAL INTERVERTEBRAL DISC: Primary | ICD-10-CM

## 2024-01-17 ENCOUNTER — OFFICE VISIT (OUTPATIENT)
Dept: NEUROSURGERY | Age: 29
End: 2024-01-17
Payer: COMMERCIAL

## 2024-01-17 VITALS — RESPIRATION RATE: 16 BRPM | HEIGHT: 76 IN | WEIGHT: 260 LBS | BODY MASS INDEX: 31.66 KG/M2

## 2024-01-17 DIAGNOSIS — M54.12 CERVICAL RADICULOPATHY: ICD-10-CM

## 2024-01-17 DIAGNOSIS — M54.2 NECK PAIN: Primary | ICD-10-CM

## 2024-01-17 PROCEDURE — G8417 CALC BMI ABV UP PARAM F/U: HCPCS | Performed by: STUDENT IN AN ORGANIZED HEALTH CARE EDUCATION/TRAINING PROGRAM

## 2024-01-17 PROCEDURE — 4004F PT TOBACCO SCREEN RCVD TLK: CPT | Performed by: STUDENT IN AN ORGANIZED HEALTH CARE EDUCATION/TRAINING PROGRAM

## 2024-01-17 PROCEDURE — G8427 DOCREV CUR MEDS BY ELIG CLIN: HCPCS | Performed by: STUDENT IN AN ORGANIZED HEALTH CARE EDUCATION/TRAINING PROGRAM

## 2024-01-17 PROCEDURE — 99202 OFFICE O/P NEW SF 15 MIN: CPT

## 2024-01-17 PROCEDURE — 99203 OFFICE O/P NEW LOW 30 MIN: CPT | Performed by: STUDENT IN AN ORGANIZED HEALTH CARE EDUCATION/TRAINING PROGRAM

## 2024-01-17 PROCEDURE — G8484 FLU IMMUNIZE NO ADMIN: HCPCS | Performed by: STUDENT IN AN ORGANIZED HEALTH CARE EDUCATION/TRAINING PROGRAM

## 2024-01-17 ASSESSMENT — ENCOUNTER SYMPTOMS
SHORTNESS OF BREATH: 0
PHOTOPHOBIA: 0
ABDOMINAL PAIN: 0
TROUBLE SWALLOWING: 0

## 2024-01-17 NOTE — PROGRESS NOTES
Subjective:      Patient ID: Aaron Rich is a 28 y.o. male with a PMH of migraine who presents for evaluation of neck pain. He states he has had this pain x 2 years. Denies any trauma. He describes this pain as a constant aching pain that radiates down his left arm. He admits to associated numbness and weakness and states he has been dropping things more recently. He has tried ibuprofen with some relief. He has also tried chiropractic manipulation 1-3 times a week for 2-3 months with no relief. He has not tried PT or BRUCE for this pain. He is a nonsmoker, but does chew tobacco daily. He denies any blood thinner usage. MRI reviewed with patient.      Review of Systems   Constitutional:  Negative for chills and fever.   HENT:  Negative for trouble swallowing.    Eyes:  Negative for photophobia.   Respiratory:  Negative for shortness of breath.    Cardiovascular:  Negative for chest pain.   Gastrointestinal:  Negative for abdominal pain.   Endocrine: Negative for heat intolerance.   Genitourinary:  Negative for difficulty urinating and flank pain.   Musculoskeletal:  Positive for neck pain and neck stiffness. Negative for myalgias.   Skin:  Negative for wound.   Neurological:  Positive for weakness and numbness. Negative for headaches.   Psychiatric/Behavioral:  Negative for confusion.      Objective:   Physical Exam  HENT:      Head: Normocephalic.   Eyes:      Pupils: Pupils are equal, round, and reactive to light.   Cardiovascular:      Rate and Rhythm: Normal rate.   Pulmonary:      Effort: Pulmonary effort is normal.   Abdominal:      General: There is no distension.   Musculoskeletal:      Cervical back: Normal range of motion.      Comments: Tenderness to palpation of cervical spine   Skin:     General: Skin is warm and dry.   Neurological:      Mental Status: He is alert.      Comments: A&Ox3  CN3-12 intact  Motor Strength full   Sensation intact to light touch   Reflexes normal   (-)Hoffmans   Psychiatric:

## 2025-08-06 ENCOUNTER — APPOINTMENT (OUTPATIENT)
Dept: GENERAL RADIOLOGY | Age: 30
End: 2025-08-06

## 2025-08-06 ENCOUNTER — HOSPITAL ENCOUNTER (EMERGENCY)
Age: 30
Discharge: HOME | End: 2025-08-06
Attending: STUDENT IN AN ORGANIZED HEALTH CARE EDUCATION/TRAINING PROGRAM

## 2025-08-06 VITALS
HEIGHT: 76 IN | TEMPERATURE: 97.7 F | SYSTOLIC BLOOD PRESSURE: 140 MMHG | BODY MASS INDEX: 34.7 KG/M2 | WEIGHT: 285 LBS | HEART RATE: 85 BPM | DIASTOLIC BLOOD PRESSURE: 82 MMHG | OXYGEN SATURATION: 97 % | RESPIRATION RATE: 16 BRPM

## 2025-08-06 DIAGNOSIS — S90.32XA CONTUSION OF LEFT FOOT, INITIAL ENCOUNTER: Primary | ICD-10-CM

## 2025-08-06 PROCEDURE — 73620 X-RAY EXAM OF FOOT: CPT

## 2025-08-06 PROCEDURE — 6370000000 HC RX 637 (ALT 250 FOR IP): Performed by: STUDENT IN AN ORGANIZED HEALTH CARE EDUCATION/TRAINING PROGRAM

## 2025-08-06 PROCEDURE — 99283 EMERGENCY DEPT VISIT LOW MDM: CPT

## 2025-08-06 RX ORDER — IBUPROFEN 600 MG/1
600 TABLET, FILM COATED ORAL ONCE
Status: COMPLETED | OUTPATIENT
Start: 2025-08-06 | End: 2025-08-06

## 2025-08-06 RX ADMIN — IBUPROFEN 600 MG: 600 TABLET ORAL at 01:47

## 2025-08-06 ASSESSMENT — LIFESTYLE VARIABLES
HOW MANY STANDARD DRINKS CONTAINING ALCOHOL DO YOU HAVE ON A TYPICAL DAY: PATIENT DOES NOT DRINK
HOW OFTEN DO YOU HAVE A DRINK CONTAINING ALCOHOL: NEVER

## 2025-08-06 ASSESSMENT — PAIN DESCRIPTION - ORIENTATION: ORIENTATION: LEFT

## 2025-08-06 ASSESSMENT — PAIN - FUNCTIONAL ASSESSMENT
PAIN_FUNCTIONAL_ASSESSMENT: PREVENTS OR INTERFERES SOME ACTIVE ACTIVITIES AND ADLS
PAIN_FUNCTIONAL_ASSESSMENT: 0-10

## 2025-08-06 ASSESSMENT — PAIN DESCRIPTION - ONSET: ONSET: ON-GOING

## 2025-08-06 ASSESSMENT — PAIN DESCRIPTION - DESCRIPTORS: DESCRIPTORS: ACHING

## 2025-08-06 ASSESSMENT — PAIN SCALES - GENERAL: PAINLEVEL_OUTOF10: 7

## 2025-08-06 ASSESSMENT — PAIN DESCRIPTION - LOCATION: LOCATION: FOOT

## 2025-08-06 ASSESSMENT — PAIN DESCRIPTION - FREQUENCY: FREQUENCY: CONTINUOUS

## (undated) DEVICE — BASIC PACK: Brand: CONVERTORS

## (undated) DEVICE — BANDAGE COMPR W4XL108IN WHT LAYERED NO CLSR SYN RUB ESMARCH

## (undated) DEVICE — NEPTUNE E-SEP SMOKE EVACUATION PENCIL, COATED, 70MM BLADE, PUSH BUTTON SWITCH: Brand: NEPTUNE E-SEP

## (undated) DEVICE — MARKER,SKIN,WI/RULER AND LABELS: Brand: MEDLINE

## (undated) DEVICE — GAUZE,SPONGE,4"X4",16PLY,XRAY,STRL,LF: Brand: MEDLINE

## (undated) DEVICE — INTENDED FOR TISSUE SEPARATION, AND OTHER PROCEDURES THAT REQUIRE A SHARP SURGICAL BLADE TO PUNCTURE OR CUT.: Brand: BARD-PARKER ® DISPOSABLE SCALPELS

## (undated) DEVICE — Z DISCONTINUED BY MEDLINE USE 2151814 SPLINT ORTH W4INXL15FT FBRGLS RL FRM LO PROF PD 1 SIDE LTWT

## (undated) DEVICE — COTTON UNDERCAST PADDING,REGULAR FINISH: Brand: WEBRIL

## (undated) DEVICE — PAD N ADH W3XL4IN POLY COT SFT PERF FLM EASILY CUT ABSRB

## (undated) DEVICE — TIBURON EXTREMITY SHEET: Brand: CONVERTORS

## (undated) DEVICE — CUFF TOURNIQUET 18 SNG BLADDER DUAL PORT

## (undated) DEVICE — TRAY,SKIN SCRUB,DRY,W/GAUZE: Brand: MEDLINE

## (undated) DEVICE — SOLUTION IV IRRIG POUR BRL 0.9% SODIUM CHL 2F7124

## (undated) DEVICE — INTENDED FOR TISSUE SEPARATION, AND OTHER PROCEDURES THAT REQUIRE A SHARP SURGICAL BLADE TO PUNCTURE OR CUT.: Brand: BARD-PARKER ® STAINLESS STEEL BLADES

## (undated) DEVICE — BLADE OPHTH STRL LTX FREE DISP

## (undated) DEVICE — SUTURE VCRL SZ 4-0 L27IN ABSRB VLT L17MM RB-1 1/2 CIR J304H

## (undated) DEVICE — DRESSING PETRO W3XL3IN OIL EMUL N ADH GZ KNIT IMPREG CELOS

## (undated) DEVICE — SUTURE ETHLN SZ 4-0 L18IN NONABSORBABLE BLK L19MM PS-2 3/8 1667H

## (undated) DEVICE — BANDAGE COMPR W4INXL5YD WHT BGE POLY COT M E WRP WV HK AND

## (undated) DEVICE — Z DISCONTINUED NO SUB IDED DRAIN PENROSE L12IN 0.25IN USED TO PROMOTE DRNAGE IN OPN

## (undated) DEVICE — GLOVE ORANGE PI 8 1/2   MSG9085

## (undated) DEVICE — CATHETER IV 20GA L1.16IN OD1.080MM ID0.800MM 60ML/MIN PNK

## (undated) DEVICE — NEEDLE HYPO 18GA L1.5IN PNK POLYPR HUB S STL THN WALL FILL

## (undated) DEVICE — STOCKINETTE COMPR W4XL54IN 2 PLY COT

## (undated) DEVICE — 12 ML SYRINGE,LUER-LOCK TIP: Brand: MONOJECT

## (undated) DEVICE — CONTROL SYRINGE LUER-LOCK TIP: Brand: MONOJECT

## (undated) DEVICE — SUTURE VCRL SZ 3-0 L27IN ABSRB VLT RB-1 L17MM 1/2 CIR J305H

## (undated) DEVICE — DOUBLE BASIN SET: Brand: MEDLINE INDUSTRIES, INC.

## (undated) DEVICE — 4-PORT MANIFOLD: Brand: NEPTUNE 2

## (undated) DEVICE — HANDLE CVR PATENTED RETENTION DISC STRL LIGHT SHLD

## (undated) DEVICE — TOWEL OR BLUEE 16X26IN ST 8 PACK ORB08 16X26ORTWL

## (undated) DEVICE — ELECTRODE PT RET AD L9FT HI MOIST COND ADH HYDRGEL CORDED

## (undated) DEVICE — 20 ML SYRINGE REGULAR TIP: Brand: MONOJECT

## (undated) DEVICE — BANDAGE,GAUZE,BULKEE II,4.5"X4.1YD,STRL: Brand: MEDLINE

## (undated) DEVICE — BLADE ES ELASTOMERIC COAT INSUL DURABLE BEND UPTO 90DEG

## (undated) DEVICE — SOLUTION SCRB 32OZ 7.5% POVIDONE IOD BTL GENTLE EFFECTIVE

## (undated) DEVICE — NEEDLE HYPO 25GA L1.5IN BLU POLYPR HUB S STL REG BVL STR

## (undated) DEVICE — SUTURE MCRYL SZ 0 L18IN ABSRB VLT MO-5 L31MM 1/2 CIR TAPR Y814G

## (undated) DEVICE — PEN: MARKING STD 100/CS: Brand: MEDICAL ACTION INDUSTRIES

## (undated) DEVICE — PREP TRAY 10X5X2: Brand: MEDLINE INDUSTRIES, INC.

## (undated) DEVICE — 1810 FOAM BLOCK NEEDLE COUNTER: Brand: DEVON

## (undated) DEVICE — GOWN SURG XL SMS FAB NONREINFORCED RAGLAN SLV HK LOOP CLSR